# Patient Record
Sex: FEMALE | Race: WHITE
[De-identification: names, ages, dates, MRNs, and addresses within clinical notes are randomized per-mention and may not be internally consistent; named-entity substitution may affect disease eponyms.]

---

## 2018-04-04 PROBLEM — Z00.00 ENCOUNTER FOR PREVENTIVE HEALTH EXAMINATION: Status: ACTIVE | Noted: 2018-04-04

## 2018-04-11 ENCOUNTER — APPOINTMENT (OUTPATIENT)
Dept: INFECTIOUS DISEASE | Facility: CLINIC | Age: 58
End: 2018-04-11
Payer: COMMERCIAL

## 2018-04-11 VITALS
BODY MASS INDEX: 26.2 KG/M2 | SYSTOLIC BLOOD PRESSURE: 113 MMHG | OXYGEN SATURATION: 98 % | WEIGHT: 163 LBS | DIASTOLIC BLOOD PRESSURE: 83 MMHG | HEART RATE: 62 BPM | HEIGHT: 66 IN | TEMPERATURE: 96.8 F

## 2018-04-11 DIAGNOSIS — Z82.49 FAMILY HISTORY OF ISCHEMIC HEART DISEASE AND OTHER DISEASES OF THE CIRCULATORY SYSTEM: ICD-10-CM

## 2018-04-11 DIAGNOSIS — Z83.3 FAMILY HISTORY OF DIABETES MELLITUS: ICD-10-CM

## 2018-04-11 DIAGNOSIS — G30.9 ALZHEIMER'S DISEASE, UNSPECIFIED: ICD-10-CM

## 2018-04-11 DIAGNOSIS — F02.80 ALZHEIMER'S DISEASE, UNSPECIFIED: ICD-10-CM

## 2018-04-11 PROCEDURE — 99244 OFF/OP CNSLTJ NEW/EST MOD 40: CPT

## 2018-04-11 RX ORDER — OLANZAPINE 10 MG/1
10 TABLET, FILM COATED ORAL
Qty: 30 | Refills: 0 | Status: ACTIVE | COMMUNITY
Start: 2017-10-18

## 2018-04-11 RX ORDER — VANCOMYCIN HYDROCHLORIDE 125 MG/1
125 CAPSULE ORAL
Qty: 40 | Refills: 0 | Status: ACTIVE | COMMUNITY
Start: 2017-11-30

## 2018-04-11 RX ORDER — OLANZAPINE 2.5 MG/1
2.5 TABLET, FILM COATED ORAL
Qty: 30 | Refills: 0 | Status: ACTIVE | COMMUNITY
Start: 2017-10-29

## 2018-04-11 RX ORDER — LORAZEPAM 0.5 MG/1
0.5 TABLET ORAL
Qty: 30 | Refills: 0 | Status: ACTIVE | COMMUNITY
Start: 2017-12-20

## 2018-04-11 RX ORDER — DONEPEZIL HYDROCHLORIDE 10 MG/1
10 TABLET ORAL
Qty: 30 | Refills: 0 | Status: ACTIVE | COMMUNITY
Start: 2018-03-23

## 2018-04-11 RX ORDER — MEMANTINE HYDROCHLORIDE 28 MG/1
28 CAPSULE, EXTENDED RELEASE ORAL
Refills: 0 | Status: ACTIVE | COMMUNITY
Start: 2018-04-11

## 2018-04-11 RX ORDER — VANCOMYCIN HYDROCHLORIDE 250 MG/1
250 CAPSULE ORAL
Qty: 56 | Refills: 0 | Status: ACTIVE | COMMUNITY
Start: 2017-12-26

## 2018-04-11 RX ORDER — SACCHAROMYCES BOULARDII 50 MG
250 CAPSULE ORAL
Refills: 0 | Status: ACTIVE | COMMUNITY
Start: 2018-04-11

## 2018-04-11 RX ORDER — ESCITALOPRAM OXALATE 20 MG/1
20 TABLET ORAL
Qty: 90 | Refills: 0 | Status: ACTIVE | COMMUNITY
Start: 2017-10-29

## 2018-05-10 ENCOUNTER — APPOINTMENT (OUTPATIENT)
Dept: INFECTIOUS DISEASE | Facility: CLINIC | Age: 58
End: 2018-05-10
Payer: COMMERCIAL

## 2018-05-10 VITALS
WEIGHT: 166 LBS | DIASTOLIC BLOOD PRESSURE: 79 MMHG | SYSTOLIC BLOOD PRESSURE: 103 MMHG | BODY MASS INDEX: 26.68 KG/M2 | HEIGHT: 66 IN | TEMPERATURE: 97 F | HEART RATE: 64 BPM | OXYGEN SATURATION: 98 %

## 2018-05-10 PROCEDURE — 44705 PREPARE FECAL MICROBIOTA: CPT

## 2018-05-10 PROCEDURE — 99213 OFFICE O/P EST LOW 20 MIN: CPT | Mod: 25

## 2018-05-17 ENCOUNTER — APPOINTMENT (OUTPATIENT)
Dept: INFECTIOUS DISEASE | Facility: CLINIC | Age: 58
End: 2018-05-17
Payer: COMMERCIAL

## 2018-05-17 VITALS
BODY MASS INDEX: 26.68 KG/M2 | DIASTOLIC BLOOD PRESSURE: 83 MMHG | WEIGHT: 166 LBS | OXYGEN SATURATION: 95 % | RESPIRATION RATE: 18 BRPM | SYSTOLIC BLOOD PRESSURE: 130 MMHG | TEMPERATURE: 97.6 F | HEART RATE: 78 BPM | HEIGHT: 66 IN

## 2018-05-17 DIAGNOSIS — A04.72 ENTEROCOLITIS DUE TO CLOSTRIDIUM DIFFICILE, NOT SPECIFIED AS RECURRENT: ICD-10-CM

## 2018-05-17 PROCEDURE — 99213 OFFICE O/P EST LOW 20 MIN: CPT | Mod: 25

## 2018-05-17 PROCEDURE — 44705 PREPARE FECAL MICROBIOTA: CPT

## 2020-12-31 PROBLEM — G30.9 ALZHEIMER'S DEMENTIA: Status: ACTIVE | Noted: 2018-04-11

## 2024-10-25 ENCOUNTER — INPATIENT (INPATIENT)
Facility: HOSPITAL | Age: 64
LOS: 2 days | Discharge: HOME | DRG: 379 | End: 2024-10-28
Attending: INTERNAL MEDICINE | Admitting: INTERNAL MEDICINE
Payer: MEDICARE

## 2024-10-25 VITALS
DIASTOLIC BLOOD PRESSURE: 103 MMHG | HEART RATE: 64 BPM | RESPIRATION RATE: 18 BRPM | SYSTOLIC BLOOD PRESSURE: 117 MMHG | OXYGEN SATURATION: 96 %

## 2024-10-25 DIAGNOSIS — F02.80 DEMENTIA IN OTHER DISEASES CLASSIFIED ELSEWHERE, UNSPECIFIED SEVERITY, WITHOUT BEHAVIORAL DISTURBANCE, PSYCHOTIC DISTURBANCE, MOOD DISTURBANCE, AND ANXIETY: ICD-10-CM

## 2024-10-25 DIAGNOSIS — E43 UNSPECIFIED SEVERE PROTEIN-CALORIE MALNUTRITION: ICD-10-CM

## 2024-10-25 DIAGNOSIS — J96.01 ACUTE RESPIRATORY FAILURE WITH HYPOXIA: ICD-10-CM

## 2024-10-25 DIAGNOSIS — E27.8 OTHER SPECIFIED DISORDERS OF ADRENAL GLAND: ICD-10-CM

## 2024-10-25 DIAGNOSIS — D69.6 THROMBOCYTOPENIA, UNSPECIFIED: ICD-10-CM

## 2024-10-25 DIAGNOSIS — Z66 DO NOT RESUSCITATE: ICD-10-CM

## 2024-10-25 DIAGNOSIS — J69.0 PNEUMONITIS DUE TO INHALATION OF FOOD AND VOMIT: ICD-10-CM

## 2024-10-25 DIAGNOSIS — F41.9 ANXIETY DISORDER, UNSPECIFIED: ICD-10-CM

## 2024-10-25 DIAGNOSIS — J15.69 PNEUMONIA DUE TO OTHER GRAM-NEGATIVE BACTERIA: ICD-10-CM

## 2024-10-25 DIAGNOSIS — I35.0 NONRHEUMATIC AORTIC (VALVE) STENOSIS: ICD-10-CM

## 2024-10-25 DIAGNOSIS — N39.0 URINARY TRACT INFECTION, SITE NOT SPECIFIED: ICD-10-CM

## 2024-10-25 DIAGNOSIS — I71.60 THORACOABDOMINAL AORTIC ANEURYSM, WITHOUT RUPTURE, UNSPECIFIED: ICD-10-CM

## 2024-10-25 DIAGNOSIS — I73.9 PERIPHERAL VASCULAR DISEASE, UNSPECIFIED: ICD-10-CM

## 2024-10-25 DIAGNOSIS — K92.2 GASTROINTESTINAL HEMORRHAGE, UNSPECIFIED: ICD-10-CM

## 2024-10-25 DIAGNOSIS — R19.5 OTHER FECAL ABNORMALITIES: ICD-10-CM

## 2024-10-25 DIAGNOSIS — G40.909 EPILEPSY, UNSPECIFIED, NOT INTRACTABLE, WITHOUT STATUS EPILEPTICUS: ICD-10-CM

## 2024-10-25 DIAGNOSIS — G30.9 ALZHEIMER'S DISEASE, UNSPECIFIED: ICD-10-CM

## 2024-10-25 DIAGNOSIS — N20.0 CALCULUS OF KIDNEY: ICD-10-CM

## 2024-10-25 DIAGNOSIS — R27.0 ATAXIA, UNSPECIFIED: ICD-10-CM

## 2024-10-25 LAB
ABO RH CONFIRMATION: SIGNIFICANT CHANGE UP
ALBUMIN SERPL ELPH-MCNC: 3.6 G/DL — SIGNIFICANT CHANGE UP (ref 3.3–5)
ALP SERPL-CCNC: 87 U/L — SIGNIFICANT CHANGE UP (ref 40–120)
ALT FLD-CCNC: 32 U/L — SIGNIFICANT CHANGE UP (ref 12–78)
ANION GAP SERPL CALC-SCNC: 5 MMOL/L — SIGNIFICANT CHANGE UP (ref 5–17)
APTT BLD: 26.4 SEC — SIGNIFICANT CHANGE UP (ref 24.5–35.6)
AST SERPL-CCNC: 14 U/L — LOW (ref 15–37)
BASOPHILS # BLD AUTO: 0.03 K/UL — SIGNIFICANT CHANGE UP (ref 0–0.2)
BASOPHILS NFR BLD AUTO: 0.4 % — SIGNIFICANT CHANGE UP (ref 0–2)
BILIRUB SERPL-MCNC: 1.5 MG/DL — HIGH (ref 0.2–1.2)
BLD GP AB SCN SERPL QL: SIGNIFICANT CHANGE UP
BUN SERPL-MCNC: 15 MG/DL — SIGNIFICANT CHANGE UP (ref 7–23)
CALCIUM SERPL-MCNC: 9.2 MG/DL — SIGNIFICANT CHANGE UP (ref 8.5–10.1)
CHLORIDE SERPL-SCNC: 108 MMOL/L — SIGNIFICANT CHANGE UP (ref 96–108)
CO2 SERPL-SCNC: 24 MMOL/L — SIGNIFICANT CHANGE UP (ref 22–31)
CREAT SERPL-MCNC: 1.01 MG/DL — SIGNIFICANT CHANGE UP (ref 0.5–1.3)
EGFR: 61 ML/MIN/1.73M2 — SIGNIFICANT CHANGE UP
EOSINOPHIL # BLD AUTO: 0.05 K/UL — SIGNIFICANT CHANGE UP (ref 0–0.5)
EOSINOPHIL NFR BLD AUTO: 0.7 % — SIGNIFICANT CHANGE UP (ref 0–6)
GLUCOSE SERPL-MCNC: 117 MG/DL — HIGH (ref 70–99)
HCT VFR BLD CALC: 43.6 % — SIGNIFICANT CHANGE UP (ref 34.5–45)
HGB BLD-MCNC: 14.8 G/DL — SIGNIFICANT CHANGE UP (ref 11.5–15.5)
IMM GRANULOCYTES NFR BLD AUTO: 0.6 % — SIGNIFICANT CHANGE UP (ref 0–0.9)
INR BLD: 0.98 RATIO — SIGNIFICANT CHANGE UP (ref 0.85–1.16)
LACTATE SERPL-SCNC: 1.3 MMOL/L — SIGNIFICANT CHANGE UP (ref 0.7–2)
LIDOCAIN IGE QN: 33 U/L — SIGNIFICANT CHANGE UP (ref 13–75)
LYMPHOCYTES # BLD AUTO: 1.06 K/UL — SIGNIFICANT CHANGE UP (ref 1–3.3)
LYMPHOCYTES # BLD AUTO: 15.6 % — SIGNIFICANT CHANGE UP (ref 13–44)
MCHC RBC-ENTMCNC: 29.2 PG — SIGNIFICANT CHANGE UP (ref 27–34)
MCHC RBC-ENTMCNC: 33.9 GM/DL — SIGNIFICANT CHANGE UP (ref 32–36)
MCV RBC AUTO: 86.2 FL — SIGNIFICANT CHANGE UP (ref 80–100)
MONOCYTES # BLD AUTO: 0.55 K/UL — SIGNIFICANT CHANGE UP (ref 0–0.9)
MONOCYTES NFR BLD AUTO: 8.1 % — SIGNIFICANT CHANGE UP (ref 2–14)
NEUTROPHILS # BLD AUTO: 5.08 K/UL — SIGNIFICANT CHANGE UP (ref 1.8–7.4)
NEUTROPHILS NFR BLD AUTO: 74.6 % — SIGNIFICANT CHANGE UP (ref 43–77)
PLATELET # BLD AUTO: 143 K/UL — LOW (ref 150–400)
POTASSIUM SERPL-MCNC: 4.2 MMOL/L — SIGNIFICANT CHANGE UP (ref 3.5–5.3)
POTASSIUM SERPL-SCNC: 4.2 MMOL/L — SIGNIFICANT CHANGE UP (ref 3.5–5.3)
PROT SERPL-MCNC: 7.2 GM/DL — SIGNIFICANT CHANGE UP (ref 6–8.3)
PROTHROM AB SERPL-ACNC: 11.6 SEC — SIGNIFICANT CHANGE UP (ref 9.9–13.4)
RBC # BLD: 5.06 M/UL — SIGNIFICANT CHANGE UP (ref 3.8–5.2)
RBC # FLD: 13.2 % — SIGNIFICANT CHANGE UP (ref 10.3–14.5)
SODIUM SERPL-SCNC: 137 MMOL/L — SIGNIFICANT CHANGE UP (ref 135–145)
TROPONIN I, HIGH SENSITIVITY RESULT: 14.5 NG/L — SIGNIFICANT CHANGE UP
WBC # BLD: 6.81 K/UL — SIGNIFICANT CHANGE UP (ref 3.8–10.5)
WBC # FLD AUTO: 6.81 K/UL — SIGNIFICANT CHANGE UP (ref 3.8–10.5)

## 2024-10-25 PROCEDURE — 84443 ASSAY THYROID STIM HORMONE: CPT

## 2024-10-25 PROCEDURE — 99285 EMERGENCY DEPT VISIT HI MDM: CPT

## 2024-10-25 PROCEDURE — 36415 COLL VENOUS BLD VENIPUNCTURE: CPT

## 2024-10-25 PROCEDURE — 76376 3D RENDER W/INTRP POSTPROCES: CPT

## 2024-10-25 PROCEDURE — 85027 COMPLETE CBC AUTOMATED: CPT

## 2024-10-25 PROCEDURE — 85610 PROTHROMBIN TIME: CPT

## 2024-10-25 PROCEDURE — 83540 ASSAY OF IRON: CPT

## 2024-10-25 PROCEDURE — 80048 BASIC METABOLIC PNL TOTAL CA: CPT

## 2024-10-25 PROCEDURE — 82728 ASSAY OF FERRITIN: CPT

## 2024-10-25 PROCEDURE — 81001 URINALYSIS AUTO W/SCOPE: CPT

## 2024-10-25 PROCEDURE — 74177 CT ABD & PELVIS W/CONTRAST: CPT | Mod: 26,MC

## 2024-10-25 PROCEDURE — 71260 CT THORAX DX C+: CPT | Mod: 26,MC

## 2024-10-25 PROCEDURE — 93306 TTE W/DOPPLER COMPLETE: CPT

## 2024-10-25 PROCEDURE — 85025 COMPLETE CBC W/AUTO DIFF WBC: CPT

## 2024-10-25 PROCEDURE — 87086 URINE CULTURE/COLONY COUNT: CPT

## 2024-10-25 PROCEDURE — 85730 THROMBOPLASTIN TIME PARTIAL: CPT

## 2024-10-25 PROCEDURE — 93010 ELECTROCARDIOGRAM REPORT: CPT

## 2024-10-25 PROCEDURE — 80053 COMPREHEN METABOLIC PANEL: CPT

## 2024-10-25 PROCEDURE — 83550 IRON BINDING TEST: CPT

## 2024-10-25 RX ORDER — ONDANSETRON HYDROCHLORIDE 2 MG/ML
4 INJECTION, SOLUTION INTRAMUSCULAR; INTRAVENOUS EVERY 6 HOURS
Refills: 0 | Status: DISCONTINUED | OUTPATIENT
Start: 2024-10-25 | End: 2024-10-28

## 2024-10-25 RX ORDER — PANTOPRAZOLE SODIUM 40 MG/1
80 TABLET, DELAYED RELEASE ORAL ONCE
Refills: 0 | Status: COMPLETED | OUTPATIENT
Start: 2024-10-25 | End: 2024-10-25

## 2024-10-25 RX ORDER — CEFEPIME 2 G/1
1000 INJECTION, POWDER, FOR SOLUTION INTRAVENOUS ONCE
Refills: 0 | Status: DISCONTINUED | OUTPATIENT
Start: 2024-10-25 | End: 2024-10-25

## 2024-10-25 RX ORDER — ONDANSETRON HYDROCHLORIDE 2 MG/ML
4 INJECTION, SOLUTION INTRAMUSCULAR; INTRAVENOUS ONCE
Refills: 0 | Status: COMPLETED | OUTPATIENT
Start: 2024-10-25 | End: 2024-10-25

## 2024-10-25 RX ORDER — CEFEPIME 2 G/1
1000 INJECTION, POWDER, FOR SOLUTION INTRAVENOUS ONCE
Refills: 0 | Status: COMPLETED | OUTPATIENT
Start: 2024-10-25 | End: 2024-10-25

## 2024-10-25 RX ORDER — ACETAMINOPHEN 500 MG
650 TABLET ORAL EVERY 6 HOURS
Refills: 0 | Status: DISCONTINUED | OUTPATIENT
Start: 2024-10-25 | End: 2024-10-28

## 2024-10-25 RX ORDER — VANCOMYCIN HYDROCHLORIDE 50 MG/ML
1000 KIT ORAL ONCE
Refills: 0 | Status: COMPLETED | OUTPATIENT
Start: 2024-10-25 | End: 2024-10-25

## 2024-10-25 RX ORDER — LEVETIRACETAM 500 MG/1
1000 TABLET, FILM COATED ORAL ONCE
Refills: 0 | Status: DISCONTINUED | OUTPATIENT
Start: 2024-10-25 | End: 2024-10-25

## 2024-10-25 RX ORDER — PANTOPRAZOLE SODIUM 40 MG/1
8 TABLET, DELAYED RELEASE ORAL
Qty: 80 | Refills: 0 | Status: DISCONTINUED | OUTPATIENT
Start: 2024-10-25 | End: 2024-10-27

## 2024-10-25 RX ADMIN — PANTOPRAZOLE SODIUM 80 MILLIGRAM(S): 40 TABLET, DELAYED RELEASE ORAL at 20:31

## 2024-10-25 RX ADMIN — VANCOMYCIN HYDROCHLORIDE 250 MILLIGRAM(S): KIT at 23:59

## 2024-10-25 RX ADMIN — PANTOPRAZOLE SODIUM 10 MG/HR: 40 TABLET, DELAYED RELEASE ORAL at 21:03

## 2024-10-25 RX ADMIN — ONDANSETRON HYDROCHLORIDE 4 MILLIGRAM(S): 2 INJECTION, SOLUTION INTRAMUSCULAR; INTRAVENOUS at 20:32

## 2024-10-25 RX ADMIN — CEFEPIME 1000 MILLIGRAM(S): 2 INJECTION, POWDER, FOR SOLUTION INTRAVENOUS at 12:55

## 2024-10-25 RX ADMIN — LEVETIRACETAM 1000 MILLIGRAM(S): 500 TABLET, FILM COATED ORAL at 23:45

## 2024-10-25 NOTE — ED PROVIDER NOTE - PHYSICAL EXAMINATION
Constitutional: NAD. nonverbal.   Eyes: PERRL EOMI  Head: Normocephalic atraumatic  Mouth: MMM  Cardiac: regular rate and rhythm  Resp: Lungs CTAB  GI: Abd s/nd/nt  Neuro: CN2-12 grossly intact, HOPKINS x 4  Skin: No visible rashes Constitutional: NAD. nonverbal.   Eyes: PERRL EOMI  Head: Normocephalic atraumatic  Mouth: MMM  Cardiac: regular rate and rhythm  Resp: Lungs CTAB  GI: Abd s/nd/nt  Neuro: CN2-12 grossly intact, HOPKINS x 4  Skin: No visible rashes  extrem: no edema   RECTAL brown loose  stool, guiac positive

## 2024-10-25 NOTE — ED ADULT TRIAGE NOTE - BANDS:
Pt arrives to ed for med eval. Per pt, symptoms of nausea and vomiting started Wednesday and sts that symptoms occur only when at work. Pt is not currently experiencing nausea and vomiting. Pt sts \"I need a doctors note to switch positions at work\" Pt sts had blood work done at job and is refusing labs.   
Fall Risk;

## 2024-10-25 NOTE — ED ADULT NURSE NOTE - NSFALLLASTSIX_ED_ALL_ED
Tests were normal  Please follow up at next office visit as scheduled  PSA 0 2  Recommend follow-up with Urology 
Unable to determine.

## 2024-10-25 NOTE — ED ADULT TRIAGE NOTE - CHIEF COMPLAINT QUOTE
Pt responsive to voice in triage. History of Alzheimer's. BIBEMS with c/o alerted mental status. EMS reports  came to visit pt around 5;30pm this afternoon when he found her hunched over in the wheelchair. Pt had 1 episode of coffee ground emesis.  Taken for EKG and cardiac monitor. BGM in triage 132

## 2024-10-25 NOTE — ED PROVIDER NOTE - OBJECTIVE STATEMENT
64 year old female with PMHx of alzheimers, anxiety, peripheral vascular disease, seizures, ataxia, aspiration pneumonia,  presents to ED from [facility]/o [facility]. patient poor historian at time of visit, unable to provide any history or complaints. BIBEMS after being noticed as "hunched over in wheelchair" by  while he was visiting her PTA as well as one episode of reported coffee ground like emesis. no anticoagulant usage.  patient is DNR/DNI.

## 2024-10-25 NOTE — ED PROVIDER NOTE - CARE PLAN
1 Principal Discharge DX:	GI bleed   Principal Discharge DX:	GI bleed  Secondary Diagnosis:	HCAP (healthcare-associated pneumonia)

## 2024-10-25 NOTE — ED ADULT NURSE NOTE - NSFALLRISKINTERV_ED_ALL_ED
Assistance OOB with selected safe patient handling equipment if applicable/Assistance with ambulation/Communicate fall risk and risk factors to all staff, patient, and family/Monitor gait and stability/Monitor for mental status changes and reorient to person, place, and time, as needed/Move patient closer to nursing station/within visual sight of ED staff/Provide patient with walking aids/Provide visual cue: yellow wristband, yellow gown, etc/Reinforce activity limits and safety measures with patient and family/Toileting schedule using arm’s reach rule for commode and bathroom/Use of alarms - bed, stretcher, chair and/or video monitoring/Call bell, personal items and telephone in reach/Instruct patient to call for assistance before getting out of bed/chair/stretcher/Non-slip footwear applied when patient is off stretcher/[place] to call system/Physically safe environment - no spills, clutter or unnecessary equipment/Purposeful Proactive Rounding/Room/bathroom lighting operational, light cord in reach

## 2024-10-25 NOTE — ED ADULT NURSE NOTE - OBJECTIVE STATEMENT
Pt is 64y female, from [facility] with c/o AMS & "coffee ground emesis" as per  who was visiting at facility. upon pt arrival pt needed to be extensive cleaned due to a large bowel movement, MD Lema at bedside, stool tested on Guaiac card and + as per MD Lema. Pt PMHx of Alzheimer's, nonverbal at baseline, but more lethargic and less responsive today as per daughter and  at bedside.    No anticoagulant usage.  patient is DNR/DNI.

## 2024-10-25 NOTE — ED ADULT TRIAGE NOTE - NS ED TRIAGE AVPU SCALE
Med Reconciliation
Verbal - The patient responds to verbal stimuli by opening their eyes when someone speaks to them. The patient is not fully oriented to time, place, or person.

## 2024-10-26 ENCOUNTER — RESULT REVIEW (OUTPATIENT)
Age: 64
End: 2024-10-26

## 2024-10-26 DIAGNOSIS — R00.1 BRADYCARDIA, UNSPECIFIED: ICD-10-CM

## 2024-10-26 DIAGNOSIS — I31.39 OTHER PERICARDIAL EFFUSION (NONINFLAMMATORY): ICD-10-CM

## 2024-10-26 DIAGNOSIS — I35.0 NONRHEUMATIC AORTIC (VALVE) STENOSIS: ICD-10-CM

## 2024-10-26 LAB
ALBUMIN SERPL ELPH-MCNC: 3.2 G/DL — LOW (ref 3.3–5)
ALP SERPL-CCNC: 73 U/L — SIGNIFICANT CHANGE UP (ref 40–120)
ALT FLD-CCNC: 27 U/L — SIGNIFICANT CHANGE UP (ref 12–78)
ANION GAP SERPL CALC-SCNC: 5 MMOL/L — SIGNIFICANT CHANGE UP (ref 5–17)
APPEARANCE UR: ABNORMAL
APTT BLD: 27.8 SEC — SIGNIFICANT CHANGE UP (ref 24.5–35.6)
AST SERPL-CCNC: 16 U/L — SIGNIFICANT CHANGE UP (ref 15–37)
BACTERIA # UR AUTO: ABNORMAL /HPF
BASOPHILS # BLD AUTO: 0.03 K/UL — SIGNIFICANT CHANGE UP (ref 0–0.2)
BASOPHILS NFR BLD AUTO: 0.5 % — SIGNIFICANT CHANGE UP (ref 0–2)
BILIRUB SERPL-MCNC: 1.5 MG/DL — HIGH (ref 0.2–1.2)
BILIRUB UR-MCNC: NEGATIVE — SIGNIFICANT CHANGE UP
BUN SERPL-MCNC: 12 MG/DL — SIGNIFICANT CHANGE UP (ref 7–23)
CALCIUM SERPL-MCNC: 9.2 MG/DL — SIGNIFICANT CHANGE UP (ref 8.5–10.1)
CAST: 2 /LPF — SIGNIFICANT CHANGE UP (ref 0–4)
CHLORIDE SERPL-SCNC: 111 MMOL/L — HIGH (ref 96–108)
CO2 SERPL-SCNC: 26 MMOL/L — SIGNIFICANT CHANGE UP (ref 22–31)
COLOR SPEC: YELLOW — SIGNIFICANT CHANGE UP
CREAT SERPL-MCNC: 0.87 MG/DL — SIGNIFICANT CHANGE UP (ref 0.5–1.3)
DIFF PNL FLD: ABNORMAL
EGFR: 73 ML/MIN/1.73M2 — SIGNIFICANT CHANGE UP
EOSINOPHIL # BLD AUTO: 0.23 K/UL — SIGNIFICANT CHANGE UP (ref 0–0.5)
EOSINOPHIL NFR BLD AUTO: 4.1 % — SIGNIFICANT CHANGE UP (ref 0–6)
FERRITIN SERPL-MCNC: 228 NG/ML — SIGNIFICANT CHANGE UP (ref 13–330)
GLUCOSE SERPL-MCNC: 96 MG/DL — SIGNIFICANT CHANGE UP (ref 70–99)
GLUCOSE UR QL: NEGATIVE MG/DL — SIGNIFICANT CHANGE UP
HCT VFR BLD CALC: 40.4 % — SIGNIFICANT CHANGE UP (ref 34.5–45)
HGB BLD-MCNC: 13.8 G/DL — SIGNIFICANT CHANGE UP (ref 11.5–15.5)
IMM GRANULOCYTES NFR BLD AUTO: 0.4 % — SIGNIFICANT CHANGE UP (ref 0–0.9)
INR BLD: 1.01 RATIO — SIGNIFICANT CHANGE UP (ref 0.85–1.16)
IRON SATN MFR SERPL: 24 % — SIGNIFICANT CHANGE UP (ref 14–50)
IRON SATN MFR SERPL: 57 UG/DL — SIGNIFICANT CHANGE UP (ref 30–160)
KETONES UR-MCNC: NEGATIVE MG/DL — SIGNIFICANT CHANGE UP
LEUKOCYTE ESTERASE UR-ACNC: ABNORMAL
LYMPHOCYTES # BLD AUTO: 1.91 K/UL — SIGNIFICANT CHANGE UP (ref 1–3.3)
LYMPHOCYTES # BLD AUTO: 33.8 % — SIGNIFICANT CHANGE UP (ref 13–44)
MCHC RBC-ENTMCNC: 29.6 PG — SIGNIFICANT CHANGE UP (ref 27–34)
MCHC RBC-ENTMCNC: 34.2 GM/DL — SIGNIFICANT CHANGE UP (ref 32–36)
MCV RBC AUTO: 86.7 FL — SIGNIFICANT CHANGE UP (ref 80–100)
MONOCYTES # BLD AUTO: 0.94 K/UL — HIGH (ref 0–0.9)
MONOCYTES NFR BLD AUTO: 16.6 % — HIGH (ref 2–14)
NEUTROPHILS # BLD AUTO: 2.52 K/UL — SIGNIFICANT CHANGE UP (ref 1.8–7.4)
NEUTROPHILS NFR BLD AUTO: 44.6 % — SIGNIFICANT CHANGE UP (ref 43–77)
NITRITE UR-MCNC: POSITIVE
PH UR: 6.5 — SIGNIFICANT CHANGE UP (ref 5–8)
PLATELET # BLD AUTO: 137 K/UL — LOW (ref 150–400)
POTASSIUM SERPL-MCNC: 3.8 MMOL/L — SIGNIFICANT CHANGE UP (ref 3.5–5.3)
POTASSIUM SERPL-SCNC: 3.8 MMOL/L — SIGNIFICANT CHANGE UP (ref 3.5–5.3)
PROT SERPL-MCNC: 6.3 GM/DL — SIGNIFICANT CHANGE UP (ref 6–8.3)
PROT UR-MCNC: 30 MG/DL
PROTHROM AB SERPL-ACNC: 11.6 SEC — SIGNIFICANT CHANGE UP (ref 9.9–13.4)
RBC # BLD: 4.66 M/UL — SIGNIFICANT CHANGE UP (ref 3.8–5.2)
RBC # FLD: 13.4 % — SIGNIFICANT CHANGE UP (ref 10.3–14.5)
RBC CASTS # UR COMP ASSIST: 2 /HPF — SIGNIFICANT CHANGE UP (ref 0–4)
SODIUM SERPL-SCNC: 142 MMOL/L — SIGNIFICANT CHANGE UP (ref 135–145)
SP GR SPEC: >1.03 — HIGH (ref 1–1.03)
SQUAMOUS # UR AUTO: 0 /HPF — SIGNIFICANT CHANGE UP (ref 0–5)
TIBC SERPL-MCNC: 239 UG/DL — SIGNIFICANT CHANGE UP (ref 220–430)
UIBC SERPL-MCNC: 182 UG/DL — SIGNIFICANT CHANGE UP (ref 110–370)
UROBILINOGEN FLD QL: 1 MG/DL — SIGNIFICANT CHANGE UP (ref 0.2–1)
WBC # BLD: 5.65 K/UL — SIGNIFICANT CHANGE UP (ref 3.8–10.5)
WBC # FLD AUTO: 5.65 K/UL — SIGNIFICANT CHANGE UP (ref 3.8–10.5)
WBC UR QL: >998 /HPF — HIGH (ref 0–5)

## 2024-10-26 PROCEDURE — 93306 TTE W/DOPPLER COMPLETE: CPT | Mod: 26

## 2024-10-26 PROCEDURE — 76376 3D RENDER W/INTRP POSTPROCES: CPT | Mod: 26

## 2024-10-26 PROCEDURE — 99223 1ST HOSP IP/OBS HIGH 75: CPT

## 2024-10-26 PROCEDURE — 99223 1ST HOSP IP/OBS HIGH 75: CPT | Mod: FS

## 2024-10-26 RX ORDER — DEXTROMETHORPHAN HYDROBROMIDE AND QUINIDINE SULFATE 20; 10 MG/1; MG/1
1 CAPSULE, GELATIN COATED ORAL
Refills: 0 | DISCHARGE

## 2024-10-26 RX ORDER — CEFEPIME 2 G/1
1000 INJECTION, POWDER, FOR SOLUTION INTRAVENOUS EVERY 12 HOURS
Refills: 0 | Status: DISCONTINUED | OUTPATIENT
Start: 2024-10-26 | End: 2024-10-26

## 2024-10-26 RX ORDER — FOLIC ACID 1 MG/1
1 TABLET ORAL DAILY
Refills: 0 | Status: DISCONTINUED | OUTPATIENT
Start: 2024-10-26 | End: 2024-10-28

## 2024-10-26 RX ORDER — GABAPENTIN 300 MG/1
100 CAPSULE ORAL AT BEDTIME
Refills: 0 | Status: DISCONTINUED | OUTPATIENT
Start: 2024-10-26 | End: 2024-10-28

## 2024-10-26 RX ORDER — FOLIC ACID 1 MG/1
0 TABLET ORAL
Refills: 0 | DISCHARGE

## 2024-10-26 RX ORDER — LEVETIRACETAM 500 MG/1
8 TABLET, FILM COATED ORAL
Refills: 0 | DISCHARGE

## 2024-10-26 RX ORDER — SODIUM CHLORIDE 9 MG/ML
750 INJECTION, SOLUTION INTRAMUSCULAR; INTRAVENOUS; SUBCUTANEOUS
Refills: 0 | Status: DISCONTINUED | OUTPATIENT
Start: 2024-10-26 | End: 2024-10-26

## 2024-10-26 RX ORDER — GABAPENTIN 300 MG/1
1 CAPSULE ORAL
Refills: 0 | DISCHARGE

## 2024-10-26 RX ORDER — CLONAZEPAM 1 MG
0.75 TABLET ORAL DAILY
Refills: 0 | Status: DISCONTINUED | OUTPATIENT
Start: 2024-10-26 | End: 2024-10-28

## 2024-10-26 RX ORDER — CLONAZEPAM 1 MG
1.5 TABLET ORAL
Refills: 0 | DISCHARGE

## 2024-10-26 RX ORDER — CEFEPIME 2 G/1
2000 INJECTION, POWDER, FOR SOLUTION INTRAVENOUS EVERY 12 HOURS
Refills: 0 | Status: DISCONTINUED | OUTPATIENT
Start: 2024-10-26 | End: 2024-10-28

## 2024-10-26 RX ORDER — MELATONIN 5 MG
5 TABLET ORAL AT BEDTIME
Refills: 0 | Status: DISCONTINUED | OUTPATIENT
Start: 2024-10-26 | End: 2024-10-28

## 2024-10-26 RX ORDER — LEVETIRACETAM 500 MG/1
800 TABLET, FILM COATED ORAL
Refills: 0 | Status: DISCONTINUED | OUTPATIENT
Start: 2024-10-26 | End: 2024-10-28

## 2024-10-26 RX ORDER — SODIUM CHLORIDE 9 MG/ML
1000 INJECTION, SOLUTION INTRAMUSCULAR; INTRAVENOUS; SUBCUTANEOUS
Refills: 0 | Status: DISCONTINUED | OUTPATIENT
Start: 2024-10-26 | End: 2024-10-27

## 2024-10-26 RX ADMIN — SODIUM CHLORIDE 75 MILLILITER(S): 9 INJECTION, SOLUTION INTRAMUSCULAR; INTRAVENOUS; SUBCUTANEOUS at 05:59

## 2024-10-26 RX ADMIN — Medication 5 MILLIGRAM(S): at 21:27

## 2024-10-26 RX ADMIN — SODIUM CHLORIDE 75 MILLILITER(S): 9 INJECTION, SOLUTION INTRAMUSCULAR; INTRAVENOUS; SUBCUTANEOUS at 18:12

## 2024-10-26 RX ADMIN — GABAPENTIN 100 MILLIGRAM(S): 300 CAPSULE ORAL at 21:27

## 2024-10-26 RX ADMIN — CEFEPIME 1000 MILLIGRAM(S): 2 INJECTION, POWDER, FOR SOLUTION INTRAVENOUS at 10:13

## 2024-10-26 RX ADMIN — LEVETIRACETAM 800 MILLIGRAM(S): 500 TABLET, FILM COATED ORAL at 10:13

## 2024-10-26 RX ADMIN — LEVETIRACETAM 800 MILLIGRAM(S): 500 TABLET, FILM COATED ORAL at 21:28

## 2024-10-26 RX ADMIN — PANTOPRAZOLE SODIUM 10 MG/HR: 40 TABLET, DELAYED RELEASE ORAL at 06:24

## 2024-10-26 RX ADMIN — FOLIC ACID 1 MILLIGRAM(S): 1 TABLET ORAL at 10:13

## 2024-10-26 RX ADMIN — SODIUM CHLORIDE 75 MILLILITER(S): 9 INJECTION, SOLUTION INTRAMUSCULAR; INTRAVENOUS; SUBCUTANEOUS at 15:36

## 2024-10-26 RX ADMIN — PANTOPRAZOLE SODIUM 10 MG/HR: 40 TABLET, DELAYED RELEASE ORAL at 17:08

## 2024-10-26 RX ADMIN — Medication 0.75 MILLIGRAM(S): at 12:15

## 2024-10-26 RX ADMIN — CEFEPIME 2000 MILLIGRAM(S): 2 INJECTION, POWDER, FOR SOLUTION INTRAVENOUS at 21:27

## 2024-10-26 NOTE — CONSULT NOTE ADULT - SUBJECTIVE AND OBJECTIVE BOX
Patient is a 64y old  Female who presents with a chief complaint of coffee ground emesis. (26 Oct 2024 03:06)    HPI:  65 y/o F w/ PMH of alzheimer's, anxiety, peripheral vasular disease, seizure disorder, ataxia, aspiration PNA, p/w coffee ground emesis. Patient unable to provide any history. She is transferred to ED from nursing home. As per chart patient, his  found her with coffee ground emesis. Vitals add facility also noted to have hypoxia of 88%. In ED patients find to have guiac positive stool, and also treated for pneumonia and uti. Started on IV abx.     PMH: as above  PSH: as above  Meds: per reconciliation sheet, noted below  MEDICATIONS  (STANDING):  cefepime  Injectable. 1000 milliGRAM(s) IV Push every 12 hours  clonazePAM  Tablet 0.75 milliGRAM(s) Oral daily  folic acid 1 milliGRAM(s) Oral daily  gabapentin 100 milliGRAM(s) Oral at bedtime  levETIRAcetam  Solution 800 milliGRAM(s) Oral two times a day  pantoprazole Infusion 8 mG/Hr (10 mL/Hr) IV Continuous <Continuous>  sodium chloride 0.9%. 750 milliLiter(s) (75 mL/Hr) IV Continuous <Continuous>      Allergies    No Known Allergies    Intolerances      Social: no smoking, no alcohol, no illegal drugs; no recent travel, no exposure to TB  FAMILY HISTORY:     no history of premature cardiovascular disease in first degree relatives    ROS: unable to obtain d/t medical condition     All other systems reviewed and are negative    Vital Signs Last 24 Hrs  T(C): 36.3 (26 Oct 2024 10:05), Max: 36.3 (26 Oct 2024 06:11)  T(F): 97.3 (26 Oct 2024 10:05), Max: 97.3 (26 Oct 2024 06:11)  HR: 45 (26 Oct 2024 10:05) (45 - 64)  BP: 127/72 (26 Oct 2024 10:05) (106/72 - 147/84)  BP(mean): 101 (26 Oct 2024 03:15) (83 - 101)  RR: 16 (26 Oct 2024 10:05) (11 - 18)  SpO2: 100% (26 Oct 2024 10:05) (95% - 100%)    Parameters below as of 26 Oct 2024 10:05  Patient On (Oxygen Delivery Method): room air      Daily     Daily     PE:  Constitutional: NAD  HEENT: NC/AT, EOMI, PERRLA, conjunctivae clear; ears and nose atraumatic; pharynx benign  Neck: supple; thyroid not palpable  Back: no tenderness  Respiratory: decreased breath sounds, rhonchi   Cardiovascular: S1S2 regular, no murmurs  Abdomen: soft, not tender, not distended, positive BS; liver and spleen WNL  Genitourinary: no suprapubic tenderness  Lymphatic: no LN palpable  Musculoskeletal: no muscle tenderness, no joint swelling or tenderness  Extremities: no pedal edema  Neurological/ Psychiatric:  moving all extremities  Skin: no rashes; no palpable lesions    Labs: all available labs reviewed                        13.8   5.65  )-----------( 137      ( 26 Oct 2024 07:35 )             40.4     10-26    142  |  111[H]  |  12  ----------------------------<  96  3.8   |  26  |  0.87    Ca    9.2      26 Oct 2024 07:35    TPro  6.3  /  Alb  3.2[L]  /  TBili  1.5[H]  /  DBili  x   /  AST  16  /  ALT  27  /  AlkPhos  73  10-26     LIVER FUNCTIONS - ( 26 Oct 2024 07:35 )  Alb: 3.2 g/dL / Pro: 6.3 gm/dL / ALK PHOS: 73 U/L / ALT: 27 U/L / AST: 16 U/L / GGT: x           Urinalysis Basic - ( 26 Oct 2024 07:35 )    Color: x / Appearance: x / SG: x / pH: x  Gluc: 96 mg/dL / Ketone: x  / Bili: x / Urobili: x   Blood: x / Protein: x / Nitrite: x   Leuk Esterase: x / RBC: x / WBC x   Sq Epi: x / Non Sq Epi: x / Bacteria: x          Radiology: all available radiological tests reviewed  < from: CT Abdomen and Pelvis w/ IV Cont (10.25.24 @ 21:48) >    IMPRESSION:  Chest CT: No consolidative pneumonia. Focal cluster of tree-in-bud   opacities right upper lobe could be infectious or inflammatory.  Ascending thoracic aortic aneurysm to 4.7 cm.    Abdomen/pelvis CT: No bowel obstruction or inflammatory changes.  Questionable bladder wall thickening. Please correlate clinically with   urinalysis.    --- End of Report ---    < end of copied text >    Advanced directives addressed: full resuscitation
CHIEF COMPLAINT:    HPI:  65 y/o F w/ PMH of alzheimer's, anxiety, peripheral vasular disease, seizure disorder,  ] ataxia, aspiration PNA, p/w coffee ground emesis. Patient unable to provide any history.   She is transferred to ED from nursing home.   As per chart patient, his  found her with coffee ground emesis. Vitals add facility also noted to have hypoxia of 88%. In ED patients find to have guiac positive stool, and also treated for pneumonia and uti.     PSH / Social Hx / Family Hx: Unable to obtain    (26 Oct 2024 03:06)    consulted for gerber & trace effusion.  pt nonverbal. chart reviewed.    PAST MEDICAL AND SURGICAL HISTORY:  PAST MEDICAL & SURGICAL HISTORY:      ALLERGIES:  Allergies    No Known Allergies    Intolerances        SOCIAL HISTORY:  Social History:      FAMILY  HISTORY:  FAMILY HISTORY:      MEDICATIONS:  OUTPATIENT:  Home Medications:  clonazePAM 0.5 mg oral tablet: 1.5 tab(s) orally once a day (26 Oct 2024 03:10)  folic acid: once a day (26 Oct 2024 03:11)  gabapentin 100 mg oral capsule: 1 cap(s) orally once a day (at bedtime) (26 Oct 2024 03:11)  levETIRAcetam 100 mg/mL oral solution: 8 milliliter(s) orally 2 times a day (26 Oct 2024 03:11)  Nuedexta 20 mg-10 mg oral capsule: 1 cap(s) orally 2 times a day (26 Oct 2024 03:11)      INPATIENT:  MEDICATIONS  (STANDING):  cefepime  Injectable. 2000 milliGRAM(s) IV Push every 12 hours  clonazePAM  Tablet 0.75 milliGRAM(s) Oral daily  folic acid 1 milliGRAM(s) Oral daily  gabapentin 100 milliGRAM(s) Oral at bedtime  levETIRAcetam  Solution 800 milliGRAM(s) Oral two times a day  pantoprazole Infusion 8 mG/Hr (10 mL/Hr) IV Continuous <Continuous>  sodium chloride 0.9%. 750 milliLiter(s) (75 mL/Hr) IV Continuous <Continuous>    MEDICATIONS  (PRN):  acetaminophen     Tablet .. 650 milliGRAM(s) Oral every 6 hours PRN Mild Pain (1 - 3)  ondansetron Injectable 4 milliGRAM(s) IV Push every 6 hours PRN Nausea and/or Vomiting    MEDICATIONS  (PRN):  acetaminophen     Tablet .. 650 milliGRAM(s) Oral every 6 hours PRN Mild Pain (1 - 3)  ondansetron Injectable 4 milliGRAM(s) IV Push every 6 hours PRN Nausea and/or Vomiting      REVIEW OF SYSTEMS:  ===============================  ===============================    Vital Signs Last 24 Hrs  T(C): 36.3 (26 Oct 2024 10:05), Max: 36.3 (26 Oct 2024 06:11)  T(F): 97.3 (26 Oct 2024 10:05), Max: 97.3 (26 Oct 2024 06:11)  HR: 45 (26 Oct 2024 10:05) (45 - 64)  BP: 127/72 (26 Oct 2024 10:05) (106/72 - 147/84)  BP(mean): 101 (26 Oct 2024 03:15) (83 - 101)  RR: 16 (26 Oct 2024 10:05) (11 - 18)  SpO2: 100% (26 Oct 2024 10:05) (95% - 100%)    Parameters below as of 26 Oct 2024 10:05  Patient On (Oxygen Delivery Method): room air        I&O's Summary      I&O's Detail      PHYSICAL EXAM:    Constitutional: NAD, awake HEENT: PERR, EOMI,  No oral cyananosis.  Neck:  supple,  No JVD  Respiratory: Breath sounds are clear bilaterally, No wheezing, rales or rhonchi  Cardiovascular: S1 and S2, regular rate and rhythm, no Murmurs, gallops or rubs  Gastrointestinal: Bowel Sounds present, soft, nontender.   Extremities: No peripheral edema. No clubbing or cyanosis.  Vascular: 2+ peripheral pulses    ===============================  ===============================  LABS:                         13.8   5.65  )-----------( 137      ( 26 Oct 2024 07:35 )             40.4                         14.8   6.81  )-----------( 143      ( 25 Oct 2024 20:06 )             43.6     26 Oct 2024 07:35    142    |  111    |  12     ----------------------------<  96     3.8     |  26     |  0.87   25 Oct 2024 20:06    137    |  108    |  15     ----------------------------<  117    4.2     |  24     |  1.01     Ca    9.2        26 Oct 2024 07:35  Ca    9.2        25 Oct 2024 20:06    TPro  6.3    /  Alb  3.2    /  TBili  1.5    /  DBili  x      /  AST  16     /  ALT  27     /  AlkPhos  73     26 Oct 2024 07:35  TPro  7.2    /  Alb  3.6    /  TBili  1.5    /  DBili  x      /  AST  14     /  ALT  32     /  AlkPhos  87     25 Oct 2024 20:06    PT/INR - ( 26 Oct 2024 07:35 )   PT: 11.6 sec;   INR: 1.01 ratio         PTT - ( 26 Oct 2024 07:35 )  PTT:27.8 sec    THYROID STUDIES:    ===============================  ===============================  CARDIAC BIOMARKERS:  -------  -BNP VALUES:  - BNP:   -------  -TROPONIN VALUES:   Troponin I, High Sensitivity Result: 14.50 ng/L (10-25-24 @ 20:06)      ===============================  ===============================  EKG: NSR 60s no ischemic changes Qtc 450    Tele sinus gerber in 45-60

## 2024-10-26 NOTE — PATIENT PROFILE ADULT - FALL HARM RISK - HARM RISK INTERVENTIONS
Assistance with ambulation/Assistance OOB with selected safe patient handling equipment/Communicate Risk of Fall with Harm to all staff/Discuss with provider need for PT consult/Monitor gait and stability/Reinforce activity limits and safety measures with patient and family/Tailored Fall Risk Interventions/Visual Cue: Yellow wristband and red socks/Bed in lowest position, wheels locked, appropriate side rails in place/Call bell, personal items and telephone in reach/Instruct patient to call for assistance before getting out of bed or chair/Non-slip footwear when patient is out of bed/[place] to call system/Physically safe environment - no spills, clutter or unnecessary equipment/Purposeful Proactive Rounding/Room/bathroom lighting operational, light cord in reach

## 2024-10-26 NOTE — H&P ADULT - ASSESSMENT
63 y/o F w/ PMH of alzheimer's, anxiety, peripheral vasular disease, seizure disorder, ataxia, aspiration PNA, p/w coffee ground emesis    *Coffee-ground emesis  -IV PPI  -GI consult NPO  -IVF  -Trend H/H  -Iron panel   -Guiac positive    *HCAP (Suspect Gram negative PNA) vs Aspiration PNA   -CT: Focal cluster of tree-in-bud opacities right upper lobe could be infectious or inflammatory.  -Zosyn  -Aspiration precautions  -F/u blood cx     *UTI  -CT: Questionable bladder wall thickening  -Abx  -F/u urine cx     *Thrombocytopenia  -F/u outpatient if remains stable during hospitalization     *Trace pericardial effusion  -Echo  -Inpatient vs outpatient cardio referral based on echo results     *Ascending thoracic aortic aneurysm 4.7cm  -F/u outpatient vascular for further management     *Indeterminate 4.2cm adrenal nodule  -F/u outpatient Endo for further work up     *Incidentally noted on CT: Nonobstructing renal caulculi / cyst + Adnexal cyst   -F/u outpatient for further management     *H/o alzheimer's / anxiety / peripheral vascular disease / seizure disorder / ataxia     *DVT ppx   -SCDs     >75 mins required for admission        63 y/o F w/ PMH of alzheimer's, anxiety, peripheral vascular disease, seizure disorder, ataxia, aspiration PNA, p/w coffee ground emesis    *Coffee-ground emesis  -IV PPI  -GI consult   -NPO  -IVF  -Trend H/H  -Iron panel   -Guiac positive    *HCAP (Suspect Gram negative PNA) vs Aspiration PNA   -CT: Focal cluster of tree-in-bud opacities right upper lobe could be infectious or inflammatory.  -S/p Vanco / Cefepime in ED , will c/w cefepime  -Aspiration precautions  -F/u blood cx   -ID consult     *UTI  -CT: Questionable bladder wall thickening  -Abx  -F/u urine cx     *Bradycardia on Tele + Trace pericardial effusion  -Cardio consult   -Echo  -EKG: NSR 61 bpm   -Remote Tele     *Thrombocytopenia  -F/u outpatient if remains stable during hospitalization     *Ascending thoracic aortic aneurysm 4.7cm  -F/u outpatient vascular for further management     *Indeterminate 4.2cm adrenal nodule  -F/u outpatient Endo for further work up     *Incidentally noted on CT: Nonobstructing renal caulculi / cyst + Adnexal cyst   -F/u outpatient for further management     *H/o alzheimer's / anxiety / peripheral vascular disease / seizure disorder / ataxia     *DVT ppx   -SCDs     >75 mins required for admission        65 y/o F w/ PMH of alzheimer's, anxiety, peripheral vasular disease, seizure disorder, ataxia, aspiration PNA, p/w coffee ground emesis    *Coffee-ground emesis  -IV PPI  -GI consult   -NPO  -IVF  -Trend H/H  -Iron panel   -Guiac positive    *HCAP (Suspect Gram negative PNA) vs Aspiration PNA   -CT: Focal cluster of tree-in-bud opacities right upper lobe could be infectious or inflammatory.  -S/p Vanco / Cefepime in ED , will c/w cefepime  -Aspiration precautions  -F/u blood cx   -ID consult     *UTI  -CT: Questionable bladder wall thickening  -Abx  -F/u urine cx     *Bradycardia on Tele + Trace pericardial effusion  -Cardio consult   -Echo  -EKG: NSR 61 bpm   -Remote Tele     *Thrombocytopenia  -F/u outpatient if remains stable during hospitalization     *Ascending thoracic aortic aneurysm 4.7cm  -F/u outpatient vascular for further management     *Indeterminate 4.2cm adrenal nodule  -F/u outpatient Endo for further work up     *Incidentally noted on CT: Nonobstructing renal caulculi / cyst + Adnexal cyst   -F/u outpatient for further management     *H/o alzheimer's / anxiety / peripheral vascular disease / seizure disorder / ataxia     *DVT ppx   -SCDs     >75 mins required for admission        63 y/o F w/ PMH of alzheimer's, anxiety, peripheral vasular disease, seizure disorder, ataxia, aspiration PNA, p/w coffee ground emesis    *Coffee-ground emesis  -IV PPI  -GI consult   -NPO  -IVF  -Trend H/H  -Iron panel   -Guiac positive    *HCAP (Suspect Gram negative PNA) vs Aspiration PNA   -CT: Focal cluster of tree-in-bud opacities right upper lobe could be infectious or inflammatory.  -S/p Vanco / Cefepime in ED , will c/w cefepime  -Aspiration precautions  -F/u blood cx   -ID consult     *UTI  -CT: Questionable bladder wall thickening  -Abx  -F/u urine cx     *Bradycardia on Tele + Trace pericardial effusion  -Cardio consult   -Echo  -EKG: NSR 61 bpm   -Remote Tele     *Thrombocytopenia  -F/u outpatient if remains stable during hospitalization     *Ascending thoracic aortic aneurysm 4.7cm  -F/u outpatient vascular for further management     *Indeterminate 4.2cm adrenal nodule  -F/u outpatient Endo for further work up     *Incidentally noted on CT: Nonobstructing renal caulculi / cyst + Adnexal cyst   -F/u outpatient for further management     *H/o alzheimer's / anxiety / peripheral vascular disease / seizure disorder / ataxia   -C/w home meds and f/u outpatient for further management if conditions remain stable during hospitalization     *DVT ppx   -SCDs     >75 mins required for admission

## 2024-10-26 NOTE — CONSULT NOTE ADULT - PROBLEM SELECTOR RECOMMENDATION 9
-asymptomatic. not on any neg chronotropic drugs    -check TSH (ordered  -cont. tele monitoring.  -as pt appears asymtomatic likely no treatment for bradycardia indicated as there are no prolonged pause or high grade/infrahisian AV block.

## 2024-10-26 NOTE — CONSULT NOTE ADULT - PROBLEM SELECTOR RECOMMENDATION 2
-trace effusion on CT noted - not listed in conclusions.  -reviewed echo - prelim - normal EF trace pericardial effusion. -trace effusion on CT noted - not listed in conclusions.  -reviewed echo - prelim - normal EF trace pericardial effusion.    -effusion is physiologic no need for treatment.

## 2024-10-26 NOTE — CONSULT NOTE ADULT - ASSESSMENT
63 y/o F w/ PMH of alzheimer's, anxiety, peripheral vasular disease, seizure disorder, ataxia, aspiration PNA, p/w coffee ground emesis. Patient unable to provide any history. She is transferred to ED from nursing home. As per chart patient, his  found her with coffee ground emesis. Vitals add facility also noted to have hypoxia of 88%. In ED patients find to have guiac positive stool, and also treated for pneumonia and uti. Started on IV abx.     1. Acute respiratory failure. RUL aspiration pneumonia. Pyuria. UTI. ? GI bleed. Alzheimers dementia  - imaging reviewed  - agree with cefepime adjust dose to [id number]  - continue with antibiotic coverage  - f/u urine cx blood cx  - monitor temps  - tolerating abx well so far; no side effects noted  - reason for abx use and side effects reviewed with patient  - supportive care  - aspiration precautions   - fu cbc    Clinical team may change from intravenous to oral antibiotics when the following criteria are met:   1. Patient is clinically improving/stable       a)	Improved signs and symptoms of infection from initial presentation       b)	Afebrile for 24 hours       c)	Leukocytosis trending towards normal range   2. Patient is tolerating oral intake   3. Initial/repeat blood cultures are negative     Cannot advise changing to oral antibiotic therapy until culture sensitivity is available.

## 2024-10-26 NOTE — H&P ADULT - HISTORY OF PRESENT ILLNESS
63 y/o F w/ PMH of alzheimer's, anxiety, peripheral vasular disease, seizure disorder, ataxia, aspiration PNA, p/w coffee ground emesis. Patient unable to provide any history. She is transferred to ED from nursing home. As per chart patient, his  found her with coffee ground emesis. Vitals add facility also noted to have hypoxia of 88%. In ED patients find to have guiac positive stool, and also treated for pneumonia and uti.     PSH / Social Hx / Family Hx: Unable to obtain

## 2024-10-26 NOTE — CONSULT NOTE ADULT - PROBLEM SELECTOR RECOMMENDATION 3
-prelim echo - aortic stenosis - moderate to severe - peak velocity 3.36 mean pressure gradient 27, LVOT/AV VTI ratio 0.22.  LV stroke volume index 55 (<35 ml/m2 = reduced).    -suspect low flow low gradient AS despite calculated normal stroke volume.  -Appears to be poor candidate for intervention on valve (i.e TAVR).  Await final report, will d/w family tommorrow.

## 2024-10-26 NOTE — PROVIDER CONTACT NOTE (OTHER) - SITUATION
64F, A&Ox0, alert, nonverbal hx of alzheimers, seizure disorder. Admitted w/ coffee ground emesis and AMS. Pt on remote tele due to bradicardia
64F, A&Ox0, alert, nonverbal hx of alzheimers, seizure disorder. Admitted w/ coffee ground emesis and AMS. Pt on remote tele due to bradicardia

## 2024-10-27 LAB
ANION GAP SERPL CALC-SCNC: 5 MMOL/L — SIGNIFICANT CHANGE UP (ref 5–17)
BUN SERPL-MCNC: 13 MG/DL — SIGNIFICANT CHANGE UP (ref 7–23)
CALCIUM SERPL-MCNC: 8.9 MG/DL — SIGNIFICANT CHANGE UP (ref 8.5–10.1)
CHLORIDE SERPL-SCNC: 113 MMOL/L — HIGH (ref 96–108)
CO2 SERPL-SCNC: 24 MMOL/L — SIGNIFICANT CHANGE UP (ref 22–31)
CREAT SERPL-MCNC: 0.8 MG/DL — SIGNIFICANT CHANGE UP (ref 0.5–1.3)
CULTURE RESULTS: SIGNIFICANT CHANGE UP
EGFR: 81 ML/MIN/1.73M2 — SIGNIFICANT CHANGE UP
GLUCOSE SERPL-MCNC: 97 MG/DL — SIGNIFICANT CHANGE UP (ref 70–99)
HCT VFR BLD CALC: 41.2 % — SIGNIFICANT CHANGE UP (ref 34.5–45)
HGB BLD-MCNC: 14 G/DL — SIGNIFICANT CHANGE UP (ref 11.5–15.5)
MCHC RBC-ENTMCNC: 29.6 PG — SIGNIFICANT CHANGE UP (ref 27–34)
MCHC RBC-ENTMCNC: 34 GM/DL — SIGNIFICANT CHANGE UP (ref 32–36)
MCV RBC AUTO: 87.1 FL — SIGNIFICANT CHANGE UP (ref 80–100)
PLATELET # BLD AUTO: 128 K/UL — LOW (ref 150–400)
POTASSIUM SERPL-MCNC: 3.9 MMOL/L — SIGNIFICANT CHANGE UP (ref 3.5–5.3)
POTASSIUM SERPL-SCNC: 3.9 MMOL/L — SIGNIFICANT CHANGE UP (ref 3.5–5.3)
RBC # BLD: 4.73 M/UL — SIGNIFICANT CHANGE UP (ref 3.8–5.2)
RBC # FLD: 13.2 % — SIGNIFICANT CHANGE UP (ref 10.3–14.5)
SODIUM SERPL-SCNC: 142 MMOL/L — SIGNIFICANT CHANGE UP (ref 135–145)
SPECIMEN SOURCE: SIGNIFICANT CHANGE UP
TSH SERPL-MCNC: 3.53 UU/ML — SIGNIFICANT CHANGE UP (ref 0.34–4.82)
WBC # BLD: 5.37 K/UL — SIGNIFICANT CHANGE UP (ref 3.8–10.5)
WBC # FLD AUTO: 5.37 K/UL — SIGNIFICANT CHANGE UP (ref 3.8–10.5)

## 2024-10-27 PROCEDURE — 99233 SBSQ HOSP IP/OBS HIGH 50: CPT

## 2024-10-27 PROCEDURE — 99233 SBSQ HOSP IP/OBS HIGH 50: CPT | Mod: FS

## 2024-10-27 RX ORDER — PANTOPRAZOLE SODIUM 40 MG/1
40 TABLET, DELAYED RELEASE ORAL EVERY 12 HOURS
Refills: 0 | Status: DISCONTINUED | OUTPATIENT
Start: 2024-10-27 | End: 2024-10-28

## 2024-10-27 RX ADMIN — Medication 650 MILLIGRAM(S): at 11:44

## 2024-10-27 RX ADMIN — CEFEPIME 2000 MILLIGRAM(S): 2 INJECTION, POWDER, FOR SOLUTION INTRAVENOUS at 10:13

## 2024-10-27 RX ADMIN — Medication 5 MILLIGRAM(S): at 20:23

## 2024-10-27 RX ADMIN — SODIUM CHLORIDE 75 MILLILITER(S): 9 INJECTION, SOLUTION INTRAMUSCULAR; INTRAVENOUS; SUBCUTANEOUS at 10:24

## 2024-10-27 RX ADMIN — PANTOPRAZOLE SODIUM 40 MILLIGRAM(S): 40 TABLET, DELAYED RELEASE ORAL at 23:06

## 2024-10-27 RX ADMIN — Medication 650 MILLIGRAM(S): at 12:44

## 2024-10-27 RX ADMIN — Medication 0.75 MILLIGRAM(S): at 11:33

## 2024-10-27 RX ADMIN — PANTOPRAZOLE SODIUM 10 MG/HR: 40 TABLET, DELAYED RELEASE ORAL at 03:43

## 2024-10-27 RX ADMIN — CEFEPIME 2000 MILLIGRAM(S): 2 INJECTION, POWDER, FOR SOLUTION INTRAVENOUS at 23:07

## 2024-10-27 RX ADMIN — FOLIC ACID 1 MILLIGRAM(S): 1 TABLET ORAL at 10:13

## 2024-10-27 RX ADMIN — GABAPENTIN 100 MILLIGRAM(S): 300 CAPSULE ORAL at 23:09

## 2024-10-27 RX ADMIN — LEVETIRACETAM 800 MILLIGRAM(S): 500 TABLET, FILM COATED ORAL at 23:07

## 2024-10-27 RX ADMIN — LEVETIRACETAM 800 MILLIGRAM(S): 500 TABLET, FILM COATED ORAL at 10:13

## 2024-10-27 RX ADMIN — PANTOPRAZOLE SODIUM 40 MILLIGRAM(S): 40 TABLET, DELAYED RELEASE ORAL at 11:26

## 2024-10-27 NOTE — DIETITIAN INITIAL EVALUATION ADULT - PERTINENT MEDS FT
MEDICATIONS  (STANDING):  cefepime  Injectable. 2000 milliGRAM(s) IV Push every 12 hours  clonazePAM  Tablet 0.75 milliGRAM(s) Oral daily  folic acid 1 milliGRAM(s) Oral daily  gabapentin 100 milliGRAM(s) Oral at bedtime  levETIRAcetam  Solution 800 milliGRAM(s) Oral two times a day  melatonin 5 milliGRAM(s) Oral at bedtime  pantoprazole Infusion 8 mG/Hr (10 mL/Hr) IV Continuous <Continuous>  sodium chloride 0.9%. 1000 milliLiter(s) (75 mL/Hr) IV Continuous <Continuous>    MEDICATIONS  (PRN):  acetaminophen     Tablet .. 650 milliGRAM(s) Oral every 6 hours PRN Mild Pain (1 - 3)  ondansetron Injectable 4 milliGRAM(s) IV Push every 6 hours PRN Nausea and/or Vomiting

## 2024-10-27 NOTE — DIETITIAN INITIAL EVALUATION ADULT - NAME AND PHONE
Ana Linder RDN, CDN, [facility] [place]      [phone]   [email or web address]

## 2024-10-27 NOTE — DIETITIAN INITIAL EVALUATION ADULT - ORAL INTAKE PTA/DIET HISTORY
Pt is from [facility], where she is on minced diet, aspiration precautions. Pt has Alzheimers, and gives no history.  PO intake estimated < 75% ENN > one month. Pt is full feed.

## 2024-10-27 NOTE — DIETITIAN INITIAL EVALUATION ADULT - OTHER INFO
63 y/o F w/ PMH of alzheimer's, anxiety, peripheral vasular disease, seizure disorder, ataxia, aspiration PNA, p/w coffee ground emesis. Patient unable to provide any history. She is transferred to ED from nursing home. As per chart patient, his  found her with coffee ground emesis. Vitals add facility also noted to have hypoxia of 88%. In ED patients find to have guiac positive stool, and also treated for pneumonia and uti.     Admit dx   GI hemorrhage  Unable to get bedscale weight  EMR weight is 76 kg   167#  No height in chart, approximating 5'7"  NFPE reveals moderate muscle wasting, fat wasting  Diet at [facility] is minced and moist,   pt on pureed diet with mildly thick liquids at , this is appropriate  Pt on aspiration precautions at [facility].  MOLST in chart does not address nutrition support.  Pt is DNR/DNI.  Patients  feeds her, pt is full feed.  suggest Confirm Goals of Care regarding nutrition support. Will provide nutrition/ hydration within goals of care.   Recommendations to follow in Plan/Intervention

## 2024-10-27 NOTE — DIETITIAN INITIAL EVALUATION ADULT - PERTINENT LABORATORY DATA
10-27    142  |  113[H]  |  13  ----------------------------<  97  3.9   |  24  |  0.80    Ca    8.9      27 Oct 2024 07:56    TPro  6.3  /  Alb  3.2[L]  /  TBili  1.5[H]  /  DBili  x   /  AST  16  /  ALT  27  /  AlkPhos  73  10-26

## 2024-10-27 NOTE — PROGRESS NOTE ADULT - NUTRITIONAL ASSESSMENT
This patient has been assessed with a concern for Malnutrition and has been determined to have a diagnosis/diagnoses of Moderate protein-calorie malnutrition.    This patient is being managed with:   Diet Pureed-  Mildly Thick Liquids (MILDTHICKLIQS)  Entered: Oct 26 2024 10:44AM

## 2024-10-27 NOTE — PROGRESS NOTE ADULT - PROBLEM SELECTOR PLAN 3
no acute sx   aortic stenosis - moderate to severe - peak velocity 3.36 mean pressure gradient 27, LVOT/AV VTI ratio 0.22.  LV stroke volume index 55 (<35 ml/m2 = reduced).    -suspect low flow low gradient AS despite calculated normal stroke volume.  -Appears to be poor candidate for intervention on valve (i.e TAVR). -no acute sx  -aortic stenosis - moderate to severe - peak velocity 3.36 mean pressure gradient 27, LVOT/AV VTI ratio 0.22.  LV stroke volume index 55 (<35 ml/m2 = reduced).    -suspect low flow low gradient AS despite calculated normal stroke volume.  -Appears to be poor candidate for intervention on valve (i.e TAVR) given severe dementia.  -hospitalist will have a goals of care discussion w/ pt's family tommorrow to determine  if TAVR would be a consideration for this pt from their standpoint.

## 2024-10-27 NOTE — PROGRESS NOTE ADULT - PROBLEM SELECTOR PLAN 1
symptomatic. not on any neg chronotropic drugs  TSH normal   -cont. tele monitoring.  -as pt appears asymtomatic likely no treatment for bradycardia indicated as there are no prolonged pause or high grade/infrahisian AV block.    no need for further evaluation   will sign off, please reconsult of new clinical issues arise symptomatic. not on any neg chronotropic drugs  TSH normal   -cont. tele monitoring.  -as pt appears asymtomatic likely no treatment for bradycardia indicated as there are no prolonged pause or high grade/infrahisian AV block.    -Discussed w/ hospitalist - pt is DNR/DNI unclear how aggressive family wants to be  i.e. if PM implant would a consideration.  -hospitalist will have a goals of care discussion w/ pt's family tommorrow.  -if PM is a consideration will consider d/c w/ ziopatch monitor.

## 2024-10-27 NOTE — DIETITIAN INITIAL EVALUATION ADULT - ADD RECOMMEND
Maintain pureed diet with mildly thick liquids  Add Ensure plus 1x day  MVI w/ minerals daily to ensure 100% RDA met   No straws, aspiration precautions  Record PO intake in EMR after each meal (flowsheet, nursing.)   Consider adding thiamine 100 mg daily 2/2 poor PO intake/ malnutrition  Monitor bowel movements, if no BM for >3 days, consider implementing bowel regimen.   suggest Confirm Goals of Care regarding nutrition support. Will provide nutrition/ hydration within goals of care.   Monitor PO intake, tolerance, labs and weight.

## 2024-10-28 ENCOUNTER — TRANSCRIPTION ENCOUNTER (OUTPATIENT)
Age: 64
End: 2024-10-28

## 2024-10-28 VITALS
DIASTOLIC BLOOD PRESSURE: 64 MMHG | TEMPERATURE: 98 F | HEART RATE: 52 BPM | RESPIRATION RATE: 18 BRPM | SYSTOLIC BLOOD PRESSURE: 131 MMHG | OXYGEN SATURATION: 100 %

## 2024-10-28 LAB
ANION GAP SERPL CALC-SCNC: 4 MMOL/L — LOW (ref 5–17)
BUN SERPL-MCNC: 13 MG/DL — SIGNIFICANT CHANGE UP (ref 7–23)
CALCIUM SERPL-MCNC: 8.9 MG/DL — SIGNIFICANT CHANGE UP (ref 8.5–10.1)
CHLORIDE SERPL-SCNC: 113 MMOL/L — HIGH (ref 96–108)
CO2 SERPL-SCNC: 24 MMOL/L — SIGNIFICANT CHANGE UP (ref 22–31)
CREAT SERPL-MCNC: 0.76 MG/DL — SIGNIFICANT CHANGE UP (ref 0.5–1.3)
EGFR: 86 ML/MIN/1.73M2 — SIGNIFICANT CHANGE UP
GLUCOSE SERPL-MCNC: 100 MG/DL — HIGH (ref 70–99)
HCT VFR BLD CALC: 39.6 % — SIGNIFICANT CHANGE UP (ref 34.5–45)
HGB BLD-MCNC: 13.8 G/DL — SIGNIFICANT CHANGE UP (ref 11.5–15.5)
MCHC RBC-ENTMCNC: 29.9 PG — SIGNIFICANT CHANGE UP (ref 27–34)
MCHC RBC-ENTMCNC: 34.8 GM/DL — SIGNIFICANT CHANGE UP (ref 32–36)
MCV RBC AUTO: 85.7 FL — SIGNIFICANT CHANGE UP (ref 80–100)
PLATELET # BLD AUTO: 133 K/UL — LOW (ref 150–400)
POTASSIUM SERPL-MCNC: 3.4 MMOL/L — LOW (ref 3.5–5.3)
POTASSIUM SERPL-SCNC: 3.4 MMOL/L — LOW (ref 3.5–5.3)
RBC # BLD: 4.62 M/UL — SIGNIFICANT CHANGE UP (ref 3.8–5.2)
RBC # FLD: 13 % — SIGNIFICANT CHANGE UP (ref 10.3–14.5)
SODIUM SERPL-SCNC: 141 MMOL/L — SIGNIFICANT CHANGE UP (ref 135–145)
WBC # BLD: 6.1 K/UL — SIGNIFICANT CHANGE UP (ref 3.8–10.5)
WBC # FLD AUTO: 6.1 K/UL — SIGNIFICANT CHANGE UP (ref 3.8–10.5)

## 2024-10-28 PROCEDURE — 99239 HOSP IP/OBS DSCHRG MGMT >30: CPT

## 2024-10-28 RX ORDER — POTASSIUM CHLORIDE 10 MEQ
40 TABLET, EXTENDED RELEASE ORAL ONCE
Refills: 0 | Status: COMPLETED | OUTPATIENT
Start: 2024-10-28 | End: 2024-10-28

## 2024-10-28 RX ORDER — CLONAZEPAM 1 MG
1 TABLET ORAL ONCE
Refills: 0 | Status: DISCONTINUED | OUTPATIENT
Start: 2024-10-28 | End: 2024-10-28

## 2024-10-28 RX ORDER — CEFUROXIME AXETIL 250 MG
1 TABLET ORAL
Qty: 14 | Refills: 0
Start: 2024-10-28 | End: 2024-11-03

## 2024-10-28 RX ORDER — PANTOPRAZOLE SODIUM 40 MG/1
1 TABLET, DELAYED RELEASE ORAL
Qty: 30 | Refills: 3
Start: 2024-10-28 | End: 2025-02-24

## 2024-10-28 RX ADMIN — Medication 1 MILLIGRAM(S): at 00:40

## 2024-10-28 RX ADMIN — LEVETIRACETAM 800 MILLIGRAM(S): 500 TABLET, FILM COATED ORAL at 09:59

## 2024-10-28 RX ADMIN — CEFEPIME 2000 MILLIGRAM(S): 2 INJECTION, POWDER, FOR SOLUTION INTRAVENOUS at 09:58

## 2024-10-28 RX ADMIN — Medication 40 MILLIEQUIVALENT(S): at 10:00

## 2024-10-28 RX ADMIN — PANTOPRAZOLE SODIUM 40 MILLIGRAM(S): 40 TABLET, DELAYED RELEASE ORAL at 10:00

## 2024-10-28 RX ADMIN — FOLIC ACID 1 MILLIGRAM(S): 1 TABLET ORAL at 09:58

## 2024-10-28 RX ADMIN — Medication 0.75 MILLIGRAM(S): at 10:00

## 2024-10-28 NOTE — DISCHARGE NOTE PROVIDER - HOSPITAL COURSE
65 y/o F w/ PMH of alzheimer's, anxiety, peripheral vasular disease, seizure disorder, ataxia, aspiration PNA, p/w coffee ground emesis. Patient unable to provide any history. She is transferred to ED from nursing home. As per chart patient, his  found her with coffee ground emesis. Vitals add facility also noted to have hypoxia of 88%. In ED patients find to have guiac positive stool, and also treated for pneumonia and uti.     Coffee-ground emesis  -Guiac positive  -s/p IV PPI and Protonix drip  - no emesis in the hospital  - tolerated PO diet well  - Hb stable at 13.8 on 10/28/24    *HCAP (Suspect Gram negative PNA) vs Aspiration PNA   -CT: Focal cluster of tree-in-bud opacities right upper lobe could be infectious or inflammatory.  -S/p Vanco / Cefepime   -Aspiration precautions  -cultures- no growth  -ID consult done  -will dc back to NH with PO course of Ceftin     *UTI  -CT: Questionable bladder wall thickening  -treated with Abx     *Bradycardia on Tele + Trace pericardial effusion  -Cardio consult done  -patient is stable and   -Family will consult among themselves at home and decide of aggressive treatment of like PPM placement if needed in future - will follow up in clinic (discussed the plan with  over phone)     * Moderate to Severe Aortic stenosis  -cardio consult done     *Thrombocytopenia  -F/u outpatient     *Ascending thoracic aortic aneurysm 4.7cm  -F/u outpatient vascular for further management     *Indeterminate 4.2cm adrenal nodule  -F/u outpatient Endo for further work up     *Incidentally noted on CT: Non obstructing renal caulculi / cyst + Adnexal cyst   -F/u outpatient for further management     *H/o alzheimer's / anxiety / peripheral vascular disease / seizure disorder / ataxia   -C/w home meds and f/u outpatient for further management if conditions remain stable during hospitalization     Patient is seen and examined. Will dc back to NH today.  Vital Signs Last 24 Hrs  T(C): 36.4 (28 Oct 2024 07:45), Max: 37.4 (27 Oct 2024 22:00)  T(F): 97.5 (28 Oct 2024 07:45), Max: 99.3 (27 Oct 2024 22:00)  HR: 68 (28 Oct 2024 07:45) (57 - 72)  BP: 102/57 (28 Oct 2024 07:45) (102/57 - 145/89)  BP(mean): --  RR: 18 (28 Oct 2024 07:45) (16 - 18)  SpO2: 95% (28 Oct 2024 07:45) (95% - 100%)    Parameters below as of 28 Oct 2024 07:45  Patient On (Oxygen Delivery Method): room air      GEN: NAD, comfortable, resting in bed  HEENT: NC/AT, EOMI, PERRLA, MMM, clear conjunctiva and sclera, normal hearing, no nasal discharge, throat clear, no thrush, normal dentition.   NECK: supple, no JVD, no LAD, no thyromegaly  BACK:  ROM intact, no spinal/paraspinal tenderness  CV: S1S2, RRR, no mumur  RESP: good air movement, CTABL, no rales, rhonchi or wheezing, respirations unlabored  ABD: +BS, soft, ND, NT, no guarding, no rigidity, no HSM  EXT: +2 radial and pedal pulses, no edema, no calve tenderness  SKIN: No visible Rashes or Ulcers  MSK: unable to assess due to alzheimer's disease  NEURO: unable to assess due to alzheimer's disease  PSYCH: unable to assess due to alzheimer's      65 y/o F w/ PMH of alzheimer's, anxiety, peripheral vasular disease, seizure disorder, ataxia, aspiration PNA, p/w coffee ground emesis. Patient unable to provide any history. She is transferred to ED from nursing home. As per chart patient, his  found her with coffee ground emesis. Vitals add facility also noted to have hypoxia of 88%. In ED patients find to have guiac positive stool, and also treated for pneumonia and uti.     Coffee-ground emesis  -Guiac positive  -s/p IV PPI and Protonix drip  - no emesis in the hospital  -as per GI no procedure in the hospital, follow up in clinic   - tolerated PO diet well  - Hb stable at 13.8 on 10/28/24    *HCAP (Suspect Gram negative PNA) vs Aspiration PNA   -CT: Focal cluster of tree-in-bud opacities right upper lobe could be infectious or inflammatory.  -S/p Vanco / Cefepime   -Aspiration precautions  -cultures- no growth  -ID consult done  -will dc back to NH with PO course of Ceftin     *UTI  -CT: Questionable bladder wall thickening  -treated with Abx     *Bradycardia on Tele + Trace pericardial effusion  -Cardio consult done  -patient is stable and   -Family will consult among themselves at home and decide of aggressive treatment of like PPM placement if needed in future - will follow up in clinic (discussed the plan with  over phone)     * Moderate to Severe Aortic stenosis  -cardio consult done     *Thrombocytopenia  -F/u outpatient     *Ascending thoracic aortic aneurysm 4.7cm  -F/u outpatient vascular for further management     *Indeterminate 4.2cm adrenal nodule  -F/u outpatient Endo for further work up     *Incidentally noted on CT: Non obstructing renal caulculi / cyst + Adnexal cyst   -F/u outpatient for further management     *H/o alzheimer's / anxiety / peripheral vascular disease / seizure disorder / ataxia   -C/w home meds and f/u outpatient for further management if conditions remain stable during hospitalization     Patient is seen and examined. Will dc back to NH today.  Vital Signs Last 24 Hrs  T(C): 36.4 (28 Oct 2024 07:45), Max: 37.4 (27 Oct 2024 22:00)  T(F): 97.5 (28 Oct 2024 07:45), Max: 99.3 (27 Oct 2024 22:00)  HR: 68 (28 Oct 2024 07:45) (57 - 72)  BP: 102/57 (28 Oct 2024 07:45) (102/57 - 145/89)  BP(mean): --  RR: 18 (28 Oct 2024 07:45) (16 - 18)  SpO2: 95% (28 Oct 2024 07:45) (95% - 100%)    Parameters below as of 28 Oct 2024 07:45  Patient On (Oxygen Delivery Method): room air      GEN: NAD, comfortable, resting in bed  HEENT: NC/AT, EOMI, PERRLA, MMM, clear conjunctiva and sclera, normal hearing, no nasal discharge, throat clear, no thrush, normal dentition.   NECK: supple, no JVD, no LAD, no thyromegaly  BACK:  ROM intact, no spinal/paraspinal tenderness  CV: S1S2, RRR, no mumur  RESP: good air movement, CTABL, no rales, rhonchi or wheezing, respirations unlabored  ABD: +BS, soft, ND, NT, no guarding, no rigidity, no HSM  EXT: +2 radial and pedal pulses, no edema, no calve tenderness  SKIN: No visible Rashes or Ulcers  MSK: unable to assess due to alzheimer's disease  NEURO: unable to assess due to alzheimer's disease  PSYCH: unable to assess due to alzheimer's

## 2024-10-28 NOTE — CHART NOTE - NSCHARTNOTEFT_GEN_A_CORE
I was never notified about the consult from the service from Saturday morning at 3 am, neither was coverage.   Team asking about discharge. Discussed case and chart reviewed extensively.   CGE stable normal hgb.   Ok to d/c on PPI and outaptient follow up to our office  with any provider over the next few weeks.

## 2024-10-28 NOTE — DISCHARGE NOTE NURSING/CASE MANAGEMENT/SOCIAL WORK - NSDCPEFALRISK_GEN_ALL_CORE
For information on Fall & Injury Prevention, visit: [email or web address] OR  [email or web address] OR  https://www.cdc.gov/steadi/patient.html

## 2024-10-28 NOTE — DISCHARGE NOTE NURSING/CASE MANAGEMENT/SOCIAL WORK - PATIENT PORTAL LINK FT
You can access the FollowMyHealth Patient Portal offered by [facility] by registering at the following website: [email or web address]. By joining [employer]’s FollowMyHealth portal, you will also be able to view your health information using other applications (apps) compatible with our system.

## 2024-10-28 NOTE — DISCHARGE NOTE NURSING/CASE MANAGEMENT/SOCIAL WORK - FINANCIAL ASSISTANCE
[facility] provides services at a reduced cost to those who are determined to be eligible through [facility]’s financial assistance program. Information regarding [facility]’s financial assistance program can be found by going to [email or web address] or by calling [phone].

## 2024-10-28 NOTE — PROGRESS NOTE ADULT - REASON FOR ADMISSION
coffee ground emesis.

## 2024-10-28 NOTE — PROGRESS NOTE ADULT - ASSESSMENT
A/P:    Coffee-ground emesis  -Guiac positive  -IV PPI, stopped Protonix drip  -GI consult pending   -on Pureed diet  -Trend H/H, H/H stable so far      *HCAP (Suspect Gram negative PNA) vs Aspiration PNA   -CT: Focal cluster of tree-in-bud opacities right upper lobe could be infectious or inflammatory.  -S/p Vanco / Cefepime in ED , will c/w cefepime  -Aspiration precautions  -F/u blood cx   -ID consult   -on room air    *UTI  -CT: Questionable bladder wall thickening  -Abx  -F/u urine cx     *Bradycardia on Tele + Trace pericardial effusion  -Cardio consult done  -Echo done  -EKG: NSR 61 bpm   -Remote Tele   -no treatment needed for effusion and bradycardia as per Cards    * Moderate to Severe Aortic stenosis  - final rec from cards pending    *Thrombocytopenia  -F/u outpatient if remains stable during hospitalization     *Ascending thoracic aortic aneurysm 4.7cm  -F/u outpatient vascular for further management     *Indeterminate 4.2cm adrenal nodule  -F/u outpatient Endo for further work up     *Incidentally noted on CT: Nonobstructing renal caulculi / cyst + Adnexal cyst   -F/u outpatient for further management     *H/o alzheimer's / anxiety / peripheral vascular disease / seizure disorder / ataxia   -C/w home meds and f/u outpatient for further management if conditions remain stable during hospitalization     SCD for DVT ppx     
63 y/o F w/ PMH of alzheimer's, anxiety, peripheral vasular disease, seizure disorder, ataxia, aspiration PNA, p/w coffee ground emesis. Patient unable to provide any history. She is transferred to ED from nursing home. As per chart patient, his  found her with coffee ground emesis. Vitals add facility also noted to have hypoxia of 88%. In ED patients find to have guiac positive stool, and also treated for pneumonia and uti. Started on IV abx.     1. Acute respiratory failure. RUL aspiration pneumonia. Pyuria. ? GI bleed. Alzheimers dementia  - imaging reviewed  - on cefepime [id number] #4  - continue with antibiotic coverage  - f/u urine cx blood cx no growth   - monitor temps  - tolerating abx well so far; no side effects noted  - reason for abx use and side effects reviewed with patient  - supportive care  - aspiration precautions   - fu cbc    Clinical team may change from intravenous to oral antibiotics when the following criteria are met:   1. Patient is clinically improving/stable       a)	Improved signs and symptoms of infection from initial presentation       b)	Afebrile for 24 hours       c)	Leukocytosis trending towards normal range   2. Patient is tolerating oral intake   3. Initial/repeat blood cultures are negative     when above met change to po ceftin 500mg BID x 7 day course  
65 y/o F w/ PMH of alzheimer's, anxiety, peripheral vasular disease, seizure disorder, ataxia, aspiration PNA, p/w coffee ground emesis. Patient unable to provide any history. She is transferred to ED from nursing home. As per chart patient, his  found her with coffee ground emesis. Vitals add facility also noted to have hypoxia of 88%. In ED patients find to have guiac positive stool, and also treated for pneumonia and uti. Started on IV abx.     1. Acute respiratory failure. RUL aspiration pneumonia. Pyuria. ? GI bleed. Alzheimers dementia  - imaging reviewed  - on cefepime [id number] #3  - continue with antibiotic coverage  - f/u urine cx blood cx no growth   - monitor temps  - tolerating abx well so far; no side effects noted  - reason for abx use and side effects reviewed with patient  - supportive care  - aspiration precautions   - fu cbc    Clinical team may change from intravenous to oral antibiotics when the following criteria are met:   1. Patient is clinically improving/stable       a)	Improved signs and symptoms of infection from initial presentation       b)	Afebrile for 24 hours       c)	Leukocytosis trending towards normal range   2. Patient is tolerating oral intake   3. Initial/repeat blood cultures are negative     Cannot advise changing to oral antibiotic therapy until culture sensitivity is available.  
A/P:    Coffee-ground emesis  -Guiac positive  -IV PPI  -GI consult   -will start Pureed diet  -Trend H/H, H/H stable so far  -Iron panel     *HCAP (Suspect Gram negative PNA) vs Aspiration PNA   -CT: Focal cluster of tree-in-bud opacities right upper lobe could be infectious or inflammatory.  -S/p Vanco / Cefepime in ED , will c/w cefepime  -Aspiration precautions  -F/u blood cx   -ID consult     *UTI  -CT: Questionable bladder wall thickening  -Abx  -F/u urine cx     *Bradycardia on Tele + Trace pericardial effusion  -Cardio consult   -Echo  -EKG: NSR 61 bpm   -Remote Tele     *Thrombocytopenia  -F/u outpatient if remains stable during hospitalization     *Ascending thoracic aortic aneurysm 4.7cm  -F/u outpatient vascular for further management     *Indeterminate 4.2cm adrenal nodule  -F/u outpatient Endo for further work up     *Incidentally noted on CT: Nonobstructing renal caulculi / cyst + Adnexal cyst   -F/u outpatient for further management     *H/o alzheimer's / anxiety / peripheral vascular disease / seizure disorder / ataxia   -C/w home meds and f/u outpatient for further management if conditions remain stable during hospitalization     SCD for DVT ppx

## 2024-10-28 NOTE — PROGRESS NOTE ADULT - SUBJECTIVE AND OBJECTIVE BOX
Date of service: 10-27-24 @ 18:04    pt seen and examined  no o/n events  afebrile    ROS: unable to obtain d/t medical condition    MEDICATIONS  (STANDING):  cefepime  Injectable. 2000 milliGRAM(s) IV Push every 12 hours  clonazePAM  Tablet 0.75 milliGRAM(s) Oral daily  folic acid 1 milliGRAM(s) Oral daily  gabapentin 100 milliGRAM(s) Oral at bedtime  levETIRAcetam  Solution 800 milliGRAM(s) Oral two times a day  melatonin 5 milliGRAM(s) Oral at bedtime  pantoprazole  Injectable 40 milliGRAM(s) IV Push every 12 hours    MEDICATIONS  (PRN):  acetaminophen     Tablet .. 650 milliGRAM(s) Oral every 6 hours PRN Mild Pain (1 - 3)  ondansetron Injectable 4 milliGRAM(s) IV Push every 6 hours PRN Nausea and/or Vomiting      Vital Signs Last 24 Hrs  T(C): 36.9 (27 Oct 2024 16:27), Max: 36.9 (27 Oct 2024 16:27)  T(F): 98.4 (27 Oct 2024 16:27), Max: 98.4 (27 Oct 2024 16:27)  HR: 57 (27 Oct 2024 16:27) (47 - 67)  BP: 145/89 (27 Oct 2024 16:27) (97/77 - 145/89)  BP(mean): --  RR: 17 (27 Oct 2024 16:27) (16 - 18)  SpO2: 100% (27 Oct 2024 16:27) (95% - 100%)    Parameters below as of 27 Oct 2024 16:27  Patient On (Oxygen Delivery Method): room air      PE:  Constitutional: NAD  HEENT: NC/AT, EOMI, PERRLA, conjunctivae clear; ears and nose atraumatic; pharynx benign  Neck: supple; thyroid not palpable  Back: no tenderness  Respiratory: decreased breath sounds, rhonchi   Cardiovascular: S1S2 regular, no murmurs  Abdomen: soft, not tender, not distended, positive BS; liver and spleen WNL  Genitourinary: no suprapubic tenderness  Lymphatic: no LN palpable  Musculoskeletal: no muscle tenderness, no joint swelling or tenderness  Extremities: no pedal edema  Neurological/ Psychiatric:  moving all extremities  Skin: no rashes; no palpable lesions    Labs: all available labs reviewed                        13.8   5.65  )-----------( 137      ( 26 Oct 2024 07:35 )             40.4     10-26    142  |  111[H]  |  12  ----------------------------<  96  3.8   |  26  |  0.87    Ca    9.2      26 Oct 2024 07:35    TPro  6.3  /  Alb  3.2[L]  /  TBili  1.5[H]  /  DBili  x   /  AST  16  /  ALT  27  /  AlkPhos  73  10-26     LIVER FUNCTIONS - ( 26 Oct 2024 07:35 )  Alb: 3.2 g/dL / Pro: 6.3 gm/dL / ALK PHOS: 73 U/L / ALT: 27 U/L / AST: 16 U/L / GGT: x           Urinalysis Basic - ( 26 Oct 2024 07:35 )    Color: x / Appearance: x / SG: x / pH: x  Gluc: 96 mg/dL / Ketone: x  / Bili: x / Urobili: x   Blood: x / Protein: x / Nitrite: x   Leuk Esterase: x / RBC: x / WBC x   Sq Epi: x / Non Sq Epi: x / Bacteria: x    Culture - Urine (10.26.24 @ 01:21)   Specimen Source: .Urine None  Culture Results:   <10,000 CFU/mL Normal Urogenital FloraCulture - Blood (10.25.24 @ 20:47)   Specimen Source: .Blood BLOOD  Culture Results:   No growth at 24 hours  Culture - Blood (10.25.24 @ 20:06)   Specimen Source: .Blood BLOOD  Culture Results:   No growth at 24 hours      Radiology: all available radiological tests reviewed  < from: CT Abdomen and Pelvis w/ IV Cont (10.25.24 @ 21:48) >    IMPRESSION:  Chest CT: No consolidative pneumonia. Focal cluster of tree-in-bud   opacities right upper lobe could be infectious or inflammatory.  Ascending thoracic aortic aneurysm to 4.7 cm.    Abdomen/pelvis CT: No bowel obstruction or inflammatory changes.  Questionable bladder wall thickening. Please correlate clinically with   urinalysis.    --- End of Report ---    < end of copied text >    Advanced directives addressed: full resuscitation
Patient is seen and examined. Chart is reviewed. No active GI bleeding since admission. Tolerating PO diet well.     Gen: No fever, chills, weakness  ENT: No visual changes or throat pain  Neck: No pain or stiffness  Respiratory: No cough or wheezing  Cardiovascular: No chest pain or palpitations  Gastrointestinal: No abdominal pain, nausea, vomiting, constipation, or diarrhea  Hematologic: No easy bleeding or bruising  Neurologic: No numbness or focal weakness  Psych: No depression or insomnia  Skin: No rash or itching    MEDICATIONS  (STANDING):  cefepime  Injectable. 2000 milliGRAM(s) IV Push every 12 hours  clonazePAM  Tablet 0.75 milliGRAM(s) Oral daily  folic acid 1 milliGRAM(s) Oral daily  gabapentin 100 milliGRAM(s) Oral at bedtime  levETIRAcetam  Solution 800 milliGRAM(s) Oral two times a day  melatonin 5 milliGRAM(s) Oral at bedtime  pantoprazole  Injectable 40 milliGRAM(s) IV Push every 12 hours    MEDICATIONS  (PRN):  acetaminophen     Tablet .. 650 milliGRAM(s) Oral every 6 hours PRN Mild Pain (1 - 3)  ondansetron Injectable 4 milliGRAM(s) IV Push every 6 hours PRN Nausea and/or Vomiting      Vital Signs Last 24 Hrs  T(C): 36.4 (27 Oct 2024 10:10), Max: 36.5 (26 Oct 2024 20:46)  T(F): 97.5 (27 Oct 2024 10:10), Max: 97.7 (26 Oct 2024 20:46)  HR: 63 (27 Oct 2024 11:29) (44 - 67)  BP: 122/79 (27 Oct 2024 11:29) (90/70 - 127/97)  BP(mean): 76 (26 Oct 2024 14:55) (76 - 76)  RR: 16 (27 Oct 2024 10:10) (16 - 18)  SpO2: 95% (27 Oct 2024 10:10) (95% - 100%)    Parameters below as of 27 Oct 2024 10:10  Patient On (Oxygen Delivery Method): room air    GEN: NAD, comfortable, resting in bed  HEENT: NC/AT, EOMI, PERRLA, MMM, clear conjunctiva and sclera, normal hearing, no nasal discharge, throat clear, no thrush, normal dentition.   NECK: supple, no JVD, no LAD, no thyromegaly  BACK:  ROM intact, no spinal/paraspinal tenderness  CV: S1S2, RRR, no mumur  RESP: good air movement, CTABL, no rales, rhonchi or wheezing, respirations unlabored  ABD: +BS, soft, ND, NT, no guarding, no rigidity, no HSM  EXT: +2 radial and pedal pulses, no edema, no calve tenderness  SKIN: No visible Rashes or Ulcers  MSK: unable to assess due to alzheimer's disease  NEURO: unable to assess due to alzheimer's disease  PSYCH: unable to assess due to alzheimer's       LABS:                          14.0   5.37  )-----------( 128      ( 27 Oct 2024 07:56 )             41.2     27 Oct 2024 07:56    142    |  113    |  13     ----------------------------<  97     3.9     |  24     |  0.80     Ca    8.9        27 Oct 2024 07:56    TPro  6.3    /  Alb  3.2    /  TBili  1.5    /  DBili  x      /  AST  16     /  ALT  27     /  AlkPhos  73     26 Oct 2024 07:35    LIVER FUNCTIONS - ( 26 Oct 2024 07:35 )  Alb: 3.2 g/dL / Pro: 6.3 gm/dL / ALK PHOS: 73 U/L / ALT: 27 U/L / AST: 16 U/L / GGT: x           PT/INR - ( 26 Oct 2024 07:35 )   PT: 11.6 sec;   INR: 1.01 ratio         PTT - ( 26 Oct 2024 07:35 )  PTT:27.8 sec  CAPILLARY BLOOD GLUCOSE            Urinalysis Basic - ( 27 Oct 2024 07:56 )    Color: x / Appearance: x / SG: x / pH: x  Gluc: 97 mg/dL / Ketone: x  / Bili: x / Urobili: x   Blood: x / Protein: x / Nitrite: x   Leuk Esterase: x / RBC: x / WBC x   Sq Epi: x / Non Sq Epi: x / Bacteria: x        RADIOLOGY:        
Patient is seen and examined. Chart is reviewed. No active bleeding this am. No Emesis this am.    Gen: No fever, chills, weakness  ENT: No visual changes or throat pain  Neck: No pain or stiffness  Respiratory: No cough or wheezing  Cardiovascular: No chest pain or palpitations  Gastrointestinal: No abdominal pain, nausea, vomiting, constipation, or diarrhea  Hematologic: No easy bleeding or bruising  Neurologic: No numbness or focal weakness  Psych: No depression or insomnia  Skin: No rash or itching    MEDICATIONS  (STANDING):  cefepime  Injectable. 2000 milliGRAM(s) IV Push every 12 hours  clonazePAM  Tablet 0.75 milliGRAM(s) Oral daily  folic acid 1 milliGRAM(s) Oral daily  gabapentin 100 milliGRAM(s) Oral at bedtime  levETIRAcetam  Solution 800 milliGRAM(s) Oral two times a day  pantoprazole Infusion 8 mG/Hr (10 mL/Hr) IV Continuous <Continuous>  sodium chloride 0.9%. 750 milliLiter(s) (75 mL/Hr) IV Continuous <Continuous>    MEDICATIONS  (PRN):  acetaminophen     Tablet .. 650 milliGRAM(s) Oral every 6 hours PRN Mild Pain (1 - 3)  ondansetron Injectable 4 milliGRAM(s) IV Push every 6 hours PRN Nausea and/or Vomiting      Vital Signs Last 24 Hrs  T(C): 36.3 (26 Oct 2024 10:05), Max: 36.3 (26 Oct 2024 06:11)  T(F): 97.3 (26 Oct 2024 10:05), Max: 97.3 (26 Oct 2024 06:11)  HR: 45 (26 Oct 2024 10:05) (45 - 64)  BP: 127/72 (26 Oct 2024 10:05) (106/72 - 147/84)  BP(mean): 101 (26 Oct 2024 03:15) (83 - 101)  RR: 16 (26 Oct 2024 10:05) (11 - 18)  SpO2: 100% (26 Oct 2024 10:05) (95% - 100%)    Parameters below as of 26 Oct 2024 10:05  Patient On (Oxygen Delivery Method): room air        GEN: NAD, comfortable, resting in bed  HEENT: NC/AT, EOMI, PERRLA, MMM, clear conjunctiva and sclera, normal hearing, no nasal discharge, throat clear, no thrush, normal dentition.   NECK: supple, no JVD, no LAD, no thyromegaly  BACK:  ROM intact, no spinal/paraspinal tenderness  CV: S1S2, RRR, no mumur  RESP: good air movement, CTABL, no rales, rhonchi or wheezing, respirations unlabored  ABD: +BS, soft, ND, NT, no guarding, no rigidity, no HSM  EXT: +2 radial and pedal pulses, no edema, no calve tenderness  SKIN: No visible Rashes or Ulcers  MSK: unable to assess due to alzheimer's disease  NEURO: unable to assess due to alzheimer's disease  PSYCH: unable to assess due to alzheimer's disease          LABS:                          13.8   5.65  )-----------( 137      ( 26 Oct 2024 07:35 )             40.4     26 Oct 2024 07:35    142    |  111    |  12     ----------------------------<  96     3.8     |  26     |  0.87     Ca    9.2        26 Oct 2024 07:35    TPro  6.3    /  Alb  3.2    /  TBili  1.5    /  DBili  x      /  AST  16     /  ALT  27     /  AlkPhos  73     26 Oct 2024 07:35    LIVER FUNCTIONS - ( 26 Oct 2024 07:35 )  Alb: 3.2 g/dL / Pro: 6.3 gm/dL / ALK PHOS: 73 U/L / ALT: 27 U/L / AST: 16 U/L / GGT: x           PT/INR - ( 26 Oct 2024 07:35 )   PT: 11.6 sec;   INR: 1.01 ratio         PTT - ( 26 Oct 2024 07:35 )  PTT:27.8 sec  CAPILLARY BLOOD GLUCOSE      POCT Blood Glucose.: 132 mg/dL (25 Oct 2024 19:00)        Urinalysis Basic - ( 26 Oct 2024 07:35 )    Color: x / Appearance: x / SG: x / pH: x  Gluc: 96 mg/dL / Ketone: x  / Bili: x / Urobili: x   Blood: x / Protein: x / Nitrite: x   Leuk Esterase: x / RBC: x / WBC x   Sq Epi: x / Non Sq Epi: x / Bacteria: x        RADIOLOGY:        
Date of service: 10-28-24 @ 14:03    pt seen and examined  no o/n events  weak appearing   no distress   afebrile    ROS: unable to obtain d/t medical condition      MEDICATIONS  (STANDING):  cefepime  Injectable. 2000 milliGRAM(s) IV Push every 12 hours  clonazePAM  Tablet 0.75 milliGRAM(s) Oral daily  folic acid 1 milliGRAM(s) Oral daily  gabapentin 100 milliGRAM(s) Oral at bedtime  levETIRAcetam  Solution 800 milliGRAM(s) Oral two times a day  melatonin 5 milliGRAM(s) Oral at bedtime  pantoprazole  Injectable 40 milliGRAM(s) IV Push every 12 hours      Vital Signs Last 24 Hrs  T(C): 36.4 (28 Oct 2024 07:45), Max: 37.4 (27 Oct 2024 22:00)  T(F): 97.5 (28 Oct 2024 07:45), Max: 99.3 (27 Oct 2024 22:00)  HR: 68 (28 Oct 2024 07:45) (57 - 72)  BP: 102/57 (28 Oct 2024 07:45) (102/57 - 145/89)  BP(mean): --  RR: 18 (28 Oct 2024 07:45) (16 - 18)  SpO2: 95% (28 Oct 2024 07:45) (95% - 100%)    Parameters below as of 28 Oct 2024 07:45  Patient On (Oxygen Delivery Method): room air        PE:  Constitutional: NAD  HEENT: NC/AT, EOMI, PERRLA, conjunctivae clear; ears and nose atraumatic; pharynx benign  Neck: supple; thyroid not palpable  Back: no tenderness  Respiratory: decreased breath sounds, rhonchi   Cardiovascular: S1S2 regular, no murmurs  Abdomen: soft, not tender, not distended, positive BS; liver and spleen WNL  Genitourinary: no suprapubic tenderness  Lymphatic: no LN palpable  Musculoskeletal: no muscle tenderness, no joint swelling or tenderness  Extremities: no pedal edema  Neurological/ Psychiatric:  moving all extremities  Skin: no rashes; no palpable lesions    Labs: all available labs reviewed                                   13.8   6.10  )-----------( 133      ( 28 Oct 2024 07:39 )             39.6     10-28    141  |  113[H]  |  13  ----------------------------<  100[H]  3.4[L]   |  24  |  0.76    Ca    8.9      28 Oct 2024 07:39        Urinalysis Basic - ( 26 Oct 2024 07:35 )    Color: x / Appearance: x / SG: x / pH: x  Gluc: 96 mg/dL / Ketone: x  / Bili: x / Urobili: x   Blood: x / Protein: x / Nitrite: x   Leuk Esterase: x / RBC: x / WBC x   Sq Epi: x / Non Sq Epi: x / Bacteria: x    Culture - Urine (10.26.24 @ 01:21)   Specimen Source: .Urine None  Culture Results:   <10,000 CFU/mL Normal Urogenital Almaz  Culture - Blood (10.25.24 @ 20:47)   Specimen Source: .Blood BLOOD  Culture Results:   No growth at 24 hours  Culture - Blood (10.25.24 @ 20:06)   Specimen Source: .Blood BLOOD  Culture Results:   No growth at 24 hours      Radiology: all available radiological tests reviewed  < from: CT Abdomen and Pelvis w/ IV Cont (10.25.24 @ 21:48) >    IMPRESSION:  Chest CT: No consolidative pneumonia. Focal cluster of tree-in-bud   opacities right upper lobe could be infectious or inflammatory.  Ascending thoracic aortic aneurysm to 4.7 cm.    Abdomen/pelvis CT: No bowel obstruction or inflammatory changes.  Questionable bladder wall thickening. Please correlate clinically with   urinalysis.    --- End of Report ---    < end of copied text >    Advanced directives addressed: full resuscitation
CHIEF COMPLAINT:    HPI:  65 y/o F w/ PMH of alzheimer's, anxiety, peripheral vasular disease, seizure disorder,  ] ataxia, aspiration PNA, p/w coffee ground emesis. Patient unable to provide any history.   She is transferred to ED from nursing home.   As per chart patient, his  found her with coffee ground emesis. Vitals add facility also noted to have hypoxia of 88%. In ED patients find to have guiac positive stool, and also treated for pneumonia and uti.     PSH / Social Hx / Family Hx: Unable to obtain    (26 Oct 2024 03:06)    consulted for gerber & trace effusion.  pt nonverbal. chart reviewed.    10/27/24: NAD sleeping in bed . VSS     PAST MEDICAL AND SURGICAL HISTORY:  PAST MEDICAL & SURGICAL HISTORY:      ALLERGIES:  Allergies    No Known Allergies    Intolerances        SOCIAL HISTORY:  Social History:      FAMILY  HISTORY:  FAMILY HISTORY:      MEDICATIONS:  OUTPATIENT:  Home Medications:  clonazePAM 0.5 mg oral tablet: 1.5 tab(s) orally once a day (26 Oct 2024 03:10)  folic acid: once a day (26 Oct 2024 03:11)  gabapentin 100 mg oral capsule: 1 cap(s) orally once a day (at bedtime) (26 Oct 2024 03:11)  levETIRAcetam 100 mg/mL oral solution: 8 milliliter(s) orally 2 times a day (26 Oct 2024 03:11)  Nuedexta 20 mg-10 mg oral capsule: 1 cap(s) orally 2 times a day (26 Oct 2024 03:11)      INPATIENT:  MEDICATIONS  (STANDING):  cefepime  Injectable. 2000 milliGRAM(s) IV Push every 12 hours  clonazePAM  Tablet 0.75 milliGRAM(s) Oral daily  folic acid 1 milliGRAM(s) Oral daily  gabapentin 100 milliGRAM(s) Oral at bedtime  levETIRAcetam  Solution 800 milliGRAM(s) Oral two times a day  pantoprazole Infusion 8 mG/Hr (10 mL/Hr) IV Continuous <Continuous>  sodium chloride 0.9%. 750 milliLiter(s) (75 mL/Hr) IV Continuous <Continuous>    MEDICATIONS  (PRN):  acetaminophen     Tablet .. 650 milliGRAM(s) Oral every 6 hours PRN Mild Pain (1 - 3)  ondansetron Injectable 4 milliGRAM(s) IV Push every 6 hours PRN Nausea and/or Vomiting    MEDICATIONS  (PRN):  acetaminophen     Tablet .. 650 milliGRAM(s) Oral every 6 hours PRN Mild Pain (1 - 3)  ondansetron Injectable 4 milliGRAM(s) IV Push every 6 hours PRN Nausea and/or Vomiting      REVIEW OF SYSTEMS:  ===============================  ===============================    Vital Signs Last 24 Hrs  Vital Signs Last 24 Hrs  T(C): 36.4 (27 Oct 2024 10:10), Max: 36.5 (26 Oct 2024 20:46)  T(F): 97.5 (27 Oct 2024 10:10), Max: 97.7 (26 Oct 2024 20:46)  HR: 63 (27 Oct 2024 11:29) (44 - 67)  BP: 122/79 (27 Oct 2024 11:29) (90/70 - 127/97)  BP(mean): 76 (26 Oct 2024 14:55) (76 - 76)  RR: 16 (27 Oct 2024 10:10) (16 - 18)  SpO2: 95% (27 Oct 2024 10:10) (95% - 100%)    Parameters below as of 27 Oct 2024 10:10  Patient On (Oxygen Delivery Method): room air      T(C): 36.3 (26 Oct 2024 10:05), Max: 36.3 (26 Oct 2024 06:11)  T(F): 97.3 (26 Oct 2024 10:05), Max: 97.3 (26 Oct 2024 06:11)  HR: 45 (26 Oct 2024 10:05) (45 - 64)  BP: 127/72 (26 Oct 2024 10:05) (106/72 - 147/84)  BP(mean): 101 (26 Oct 2024 03:15) (83 - 101)  RR: 16 (26 Oct 2024 10:05) (11 - 18)  SpO2: 100% (26 Oct 2024 10:05) (95% - 100%)    Parameters below as of 26 Oct 2024 10:05  Patient On (Oxygen Delivery Method): room air        I&O's Summary      I&O's Detail      PHYSICAL EXAM:    Constitutional: NAD, awake HEENT: PERR, EOMI,  No oral cyananosis.  Neck:  supple,  No JVD  Respiratory: Breath sounds are clear bilaterally, No wheezing, rales or rhonchi  Cardiovascular: S1 and S2, regular rate and rhythm, no Murmurs, gallops or rubs  Gastrointestinal: Bowel Sounds present, soft, nontender.   Extremities: No peripheral edema. No clubbing or cyanosis.  Vascular: 2+ peripheral pulses    ===============================  ===============================  LABS:            LABS: All Labs Reviewed:                        14.0   5.37  )-----------( 128      ( 27 Oct 2024 07:56 )             41.2     10-27    142  |  113[H]  |  13  ----------------------------<  97  3.9   |  24  |  0.80    Ca    8.9      27 Oct 2024 07:56    TPro  6.3  /  Alb  3.2[L]  /  TBili  1.5[H]  /  DBili  x   /  AST  16  /  ALT  27  /  AlkPhos  73  10-26    PT/INR - ( 26 Oct 2024 07:35 )   PT: 11.6 sec;   INR: 1.01 ratio         PTT - ( 26 Oct 2024 07:35 )  PTT:27.8 sec        Urinalysis Basic - ( 27 Oct 2024 07:56 )    Color: x / Appearance: x / SG: x / pH: x  Gluc: 97 mg/dL / Ketone: x  / Bili: x / Urobili: x   Blood: x / Protein: x / Nitrite: x   Leuk Esterase: x / RBC: x / WBC x   Sq Epi: x / Non Sq Epi: x / Bacteria: x                       13.8   5.65  )-----------( 137      ( 26 Oct 2024 07:35 )             40.4                         14.8   6.81  )-----------( 143      ( 25 Oct 2024 20:06 )             43.6     26 Oct 2024 07:35    142    |  111    |  12     ----------------------------<  96     3.8     |  26     |  0.87   25 Oct 2024 20:06    137    |  108    |  15     ----------------------------<  117    4.2     |  24     |  1.01     Ca    9.2        26 Oct 2024 07:35  Ca    9.2        25 Oct 2024 20:06    TPro  6.3    /  Alb  3.2    /  TBili  1.5    /  DBili  x      /  AST  16     /  ALT  27     /  AlkPhos  73     26 Oct 2024 07:35  TPro  7.2    /  Alb  3.6    /  TBili  1.5    /  DBili  x      /  AST  14     /  ALT  32     /  AlkPhos  87     25 Oct 2024 20:06    PT/INR - ( 26 Oct 2024 07:35 )   PT: 11.6 sec;   INR: 1.01 ratio         PTT - ( 26 Oct 2024 07:35 )  PTT:27.8 sec    THYROID STUDIES:    ===============================  ===============================  CARDIAC BIOMARKERS:  -------  -BNP VALUES:  - BNP:   -------  -TROPONIN VALUES:   Troponin I, High Sensitivity Result: 14.50 ng/L (10-25-24 @ 20:06)      ===============================  ===============================  EKG: NSR 60s no ischemic changes Qtc 450    Tele sinus gerber in 45-60    ECHO  CONCLUSIONS:      1. Technically difficult image quality.   2. Left ventricular systolic function is normal with an ejection fraction of 60 % by Marquis's method of disks.   3. The left ventricular diastolic function is indeterminate.   4. Left atrium is normal in size.   5. Trace mitral regurgitation.   6. Borderline reduced right ventricular systolic function.   7. The right atrium is normal in size.   8. No echocardiographic evidence of pulmonary hypertension.   9. Trileaflet aortic valve with reduced systolic excursion. There is severe calcification of the aortic valve leaflets. Moderate to severe aortic stenosis.  10. Ascending aorta is aneurysmal, measuring 4.80 cm (indexed 3.20 cm/m²).  11. The peak transaortic velocity is 3.36 m/s, peak transaortic gradient is 45.2 mmHg and mean transaortic gradient is 27.0 mmHg with an LVOT/aortic valve VTI ratio of 0.22. The effective orifice area is estimated at 0.76 cm² by the continuity equation.  12. Trace aortic regurgitation.  13. Trace pulmonic regurgitation.  14. Trace pericardial effusion.  15. The interatrial septum appears intact.    ________________________________________________________________________________________

## 2024-10-28 NOTE — DISCHARGE NOTE PROVIDER - NSDCMRMEDTOKEN_GEN_ALL_CORE_FT
cefuroxime 500 mg oral tablet: 1 tab(s) orally 2 times a day  clonazePAM 0.5 mg oral tablet: 1.5 tab(s) orally once a day  folic acid: once a day  gabapentin 100 mg oral capsule: 1 cap(s) orally once a day (at bedtime)  levETIRAcetam 100 mg/mL oral solution: 8 milliliter(s) orally 2 times a day  Nuedexta 20 mg-10 mg oral capsule: 1 cap(s) orally 2 times a day  Protonix 40 mg oral delayed release tablet: 1 tab(s) orally once a day

## 2024-10-28 NOTE — DISCHARGE NOTE PROVIDER - CARE PROVIDER_API CALL
Keyon Beebe  Internal Medicine  [address] [place] [address]  Phone: [phone]  Fax: [phone]  Established Patient  Follow Up Time:

## 2024-10-28 NOTE — DISCHARGE NOTE PROVIDER - DETAILS OF MALNUTRITION DIAGNOSIS/DIAGNOSES
This patient has been assessed with a concern for Malnutrition and was treated during this hospitalization for the following Nutrition diagnosis/diagnoses:     -  10/27/2024: Moderate protein-calorie malnutrition

## 2024-10-31 LAB
CULTURE RESULTS: SIGNIFICANT CHANGE UP
CULTURE RESULTS: SIGNIFICANT CHANGE UP
SPECIMEN SOURCE: SIGNIFICANT CHANGE UP
SPECIMEN SOURCE: SIGNIFICANT CHANGE UP
